# Patient Record
Sex: MALE | Race: OTHER | HISPANIC OR LATINO
[De-identification: names, ages, dates, MRNs, and addresses within clinical notes are randomized per-mention and may not be internally consistent; named-entity substitution may affect disease eponyms.]

---

## 2024-10-04 VITALS — BODY MASS INDEX: 22.8 KG/M2 | HEIGHT: 67.3 IN | WEIGHT: 147 LBS

## 2024-10-04 DIAGNOSIS — Z86.39 PERSONAL HISTORY OF OTHER ENDOCRINE, NUTRITIONAL AND METABOLIC DISEASE: ICD-10-CM

## 2024-10-04 DIAGNOSIS — M50.90 CERVICAL DISC DISORDER, UNSPECIFIED, UNSPECIFIED CERVICAL REGION: ICD-10-CM

## 2024-10-04 DIAGNOSIS — I24.9 ACUTE ISCHEMIC HEART DISEASE, UNSPECIFIED: ICD-10-CM

## 2024-10-04 DIAGNOSIS — K57.90 DIVERTICULOSIS OF INTESTINE, PART UNSPECIFIED, W/OUT PERFORATION OR ABSCESS W/OUT BLEEDING: ICD-10-CM

## 2024-10-04 DIAGNOSIS — Z87.39 PERSONAL HISTORY OF OTHER DISEASES OF THE MUSCULOSKELETAL SYSTEM AND CONNECTIVE TISSUE: ICD-10-CM

## 2024-10-04 DIAGNOSIS — Z95.5 PRESENCE OF CORONARY ANGIOPLASTY IMPLANT AND GRAFT: ICD-10-CM

## 2024-10-04 DIAGNOSIS — I25.10 ATHEROSCLEROTIC HEART DISEASE OF NATIVE CORONARY ARTERY W/OUT ANGINA PECTORIS: ICD-10-CM

## 2024-10-04 DIAGNOSIS — Z86.79 PERSONAL HISTORY OF OTHER DISEASES OF THE CIRCULATORY SYSTEM: ICD-10-CM

## 2024-10-04 DIAGNOSIS — I25.2 OLD MYOCARDIAL INFARCTION: ICD-10-CM

## 2024-10-04 DIAGNOSIS — Z87.438 PERSONAL HISTORY OF OTHER DISEASES OF MALE GENITAL ORGANS: ICD-10-CM

## 2024-10-04 DIAGNOSIS — I21.4 NON-ST ELEVATION (NSTEMI) MYOCARDIAL INFARCTION: ICD-10-CM

## 2024-10-04 DIAGNOSIS — E53.8 DEFICIENCY OF OTHER SPECIFIED B GROUP VITAMINS: ICD-10-CM

## 2024-10-04 DIAGNOSIS — N18.2 CHRONIC KIDNEY DISEASE, STAGE 2 (MILD): ICD-10-CM

## 2024-10-04 DIAGNOSIS — M15.9 POLYOSTEOARTHRITIS, UNSPECIFIED: ICD-10-CM

## 2024-10-04 DIAGNOSIS — Z87.891 PERSONAL HISTORY OF NICOTINE DEPENDENCE: ICD-10-CM

## 2024-10-04 DIAGNOSIS — I48.0 PAROXYSMAL ATRIAL FIBRILLATION: ICD-10-CM

## 2024-10-04 PROBLEM — Z00.00 ENCOUNTER FOR PREVENTIVE HEALTH EXAMINATION: Status: ACTIVE | Noted: 2024-10-04

## 2024-10-04 RX ORDER — ATORVASTATIN CALCIUM 80 MG/1
80 TABLET, FILM COATED ORAL
Qty: 1 | Refills: 1 | Status: ACTIVE | COMMUNITY

## 2024-10-04 RX ORDER — INSULIN GLARGINE-YFGN 100 [IU]/ML
100 INJECTION, SOLUTION SUBCUTANEOUS AT BEDTIME
Refills: 0 | Status: ACTIVE | COMMUNITY

## 2024-10-04 RX ORDER — CANAGLIFLOZIN 300 MG/1
300 TABLET, FILM COATED ORAL DAILY
Refills: 0 | Status: ACTIVE | COMMUNITY

## 2024-10-04 RX ORDER — DULOXETINE HYDROCHLORIDE 60 MG/1
60 CAPSULE, DELAYED RELEASE PELLETS ORAL AT BEDTIME
Refills: 0 | Status: ACTIVE | COMMUNITY

## 2024-10-04 RX ORDER — APIXABAN 5 MG/1
5 TABLET, FILM COATED ORAL
Qty: 60 | Refills: 3 | Status: ACTIVE | COMMUNITY

## 2024-10-04 RX ORDER — ISOSORBIDE MONONITRATE 30 MG/1
30 TABLET, EXTENDED RELEASE ORAL DAILY
Refills: 0 | Status: ACTIVE | COMMUNITY

## 2024-10-04 RX ORDER — FUROSEMIDE 20 MG/1
20 TABLET ORAL DAILY
Qty: 30 | Refills: 0 | Status: ACTIVE | COMMUNITY

## 2024-10-04 RX ORDER — CARVEDILOL 25 MG/1
25 TABLET, FILM COATED ORAL TWICE DAILY
Refills: 0 | Status: ACTIVE | COMMUNITY

## 2024-10-04 RX ORDER — GLIPIZIDE 10 MG/1
10 TABLET, FILM COATED, EXTENDED RELEASE ORAL DAILY
Refills: 0 | Status: ACTIVE | COMMUNITY

## 2024-10-04 RX ORDER — LOSARTAN POTASSIUM 25 MG/1
25 TABLET, FILM COATED ORAL DAILY
Qty: 30 | Refills: 0 | Status: ACTIVE | COMMUNITY

## 2024-10-04 RX ORDER — AMLODIPINE BESYLATE 5 MG/1
5 TABLET ORAL DAILY
Qty: 1 | Refills: 1 | Status: ACTIVE | COMMUNITY

## 2024-10-04 RX ORDER — DEXTRAN 70/HYPROMELLOSE 0.1%-0.3%
0.1-0.3 DROPS OPHTHALMIC (EYE) 4 TIMES DAILY
Refills: 0 | Status: ACTIVE | COMMUNITY

## 2024-10-04 RX ORDER — EZETIMIBE 10 MG/1
10 TABLET ORAL DAILY
Qty: 30 | Refills: 3 | Status: ACTIVE | COMMUNITY

## 2024-10-04 RX ORDER — TAMSULOSIN HYDROCHLORIDE 0.4 MG/1
0.4 CAPSULE ORAL
Refills: 0 | Status: ACTIVE | COMMUNITY

## 2024-10-15 ENCOUNTER — NON-APPOINTMENT (OUTPATIENT)
Age: 79
End: 2024-10-15

## 2024-10-15 ENCOUNTER — APPOINTMENT (OUTPATIENT)
Dept: CARDIOTHORACIC SURGERY | Facility: CLINIC | Age: 79
End: 2024-10-15
Payer: MEDICARE

## 2024-10-15 VITALS
HEIGHT: 69 IN | BODY MASS INDEX: 21.77 KG/M2 | HEART RATE: 80 BPM | SYSTOLIC BLOOD PRESSURE: 126 MMHG | DIASTOLIC BLOOD PRESSURE: 60 MMHG | WEIGHT: 147 LBS | OXYGEN SATURATION: 97 % | TEMPERATURE: 97 F

## 2024-10-15 DIAGNOSIS — I21.4 NON-ST ELEVATION (NSTEMI) MYOCARDIAL INFARCTION: ICD-10-CM

## 2024-10-15 PROCEDURE — 99205 OFFICE O/P NEW HI 60 MIN: CPT

## 2024-10-17 ENCOUNTER — INPATIENT (INPATIENT)
Facility: HOSPITAL | Age: 79
LOS: 5 days | Discharge: HOME HEALTH | End: 2024-10-23
Attending: THORACIC SURGERY (CARDIOTHORACIC VASCULAR SURGERY) | Admitting: THORACIC SURGERY (CARDIOTHORACIC VASCULAR SURGERY)
Payer: MEDICARE

## 2024-10-17 VITALS — HEIGHT: 66 IN | TEMPERATURE: 98 F | WEIGHT: 143.96 LBS

## 2024-10-17 LAB
A1C WITH ESTIMATED AVERAGE GLUCOSE RESULT: 9.1 % — HIGH (ref 4–5.6)
ADD ON TEST-SPECIMEN IN LAB: SIGNIFICANT CHANGE UP
ALBUMIN SERPL ELPH-MCNC: 4 G/DL — SIGNIFICANT CHANGE UP (ref 3.3–5)
ALP SERPL-CCNC: 123 U/L — HIGH (ref 40–120)
ALT FLD-CCNC: 25 U/L — SIGNIFICANT CHANGE UP (ref 10–45)
ANION GAP SERPL CALC-SCNC: 10 MMOL/L — SIGNIFICANT CHANGE UP (ref 5–17)
APTT BLD: 34.5 SEC — SIGNIFICANT CHANGE UP (ref 24.5–35.6)
APTT BLD: 52.4 SEC — HIGH (ref 24.5–35.6)
AST SERPL-CCNC: 20 U/L — SIGNIFICANT CHANGE UP (ref 10–40)
BASOPHILS # BLD AUTO: 0.06 K/UL — SIGNIFICANT CHANGE UP (ref 0–0.2)
BASOPHILS NFR BLD AUTO: 0.7 % — SIGNIFICANT CHANGE UP (ref 0–2)
BILIRUB SERPL-MCNC: 0.6 MG/DL — SIGNIFICANT CHANGE UP (ref 0.2–1.2)
BLD GP AB SCN SERPL QL: NEGATIVE — SIGNIFICANT CHANGE UP
BUN SERPL-MCNC: 26 MG/DL — HIGH (ref 7–23)
CALCIUM SERPL-MCNC: 9.2 MG/DL — SIGNIFICANT CHANGE UP (ref 8.4–10.5)
CHLORIDE SERPL-SCNC: 100 MMOL/L — SIGNIFICANT CHANGE UP (ref 96–108)
CO2 SERPL-SCNC: 27 MMOL/L — SIGNIFICANT CHANGE UP (ref 22–31)
CREAT SERPL-MCNC: 1.06 MG/DL — SIGNIFICANT CHANGE UP (ref 0.5–1.3)
EGFR: 72 ML/MIN/1.73M2 — SIGNIFICANT CHANGE UP
EOSINOPHIL # BLD AUTO: 0.17 K/UL — SIGNIFICANT CHANGE UP (ref 0–0.5)
EOSINOPHIL NFR BLD AUTO: 2.1 % — SIGNIFICANT CHANGE UP (ref 0–6)
ESTIMATED AVERAGE GLUCOSE: 214 MG/DL — HIGH (ref 68–114)
GLUCOSE BLDC GLUCOMTR-MCNC: 136 MG/DL — HIGH (ref 70–99)
GLUCOSE BLDC GLUCOMTR-MCNC: 142 MG/DL — HIGH (ref 70–99)
GLUCOSE SERPL-MCNC: 184 MG/DL — HIGH (ref 70–99)
HCT VFR BLD CALC: 42.7 % — SIGNIFICANT CHANGE UP (ref 39–50)
HGB BLD-MCNC: 14.1 G/DL — SIGNIFICANT CHANGE UP (ref 13–17)
IMM GRANULOCYTES NFR BLD AUTO: 0.2 % — SIGNIFICANT CHANGE UP (ref 0–0.9)
INR BLD: 1.09 — SIGNIFICANT CHANGE UP (ref 0.85–1.16)
LYMPHOCYTES # BLD AUTO: 1.92 K/UL — SIGNIFICANT CHANGE UP (ref 1–3.3)
LYMPHOCYTES # BLD AUTO: 23.9 % — SIGNIFICANT CHANGE UP (ref 13–44)
MAGNESIUM SERPL-MCNC: 2.5 MG/DL — SIGNIFICANT CHANGE UP (ref 1.6–2.6)
MCHC RBC-ENTMCNC: 28.4 PG — SIGNIFICANT CHANGE UP (ref 27–34)
MCHC RBC-ENTMCNC: 33 GM/DL — SIGNIFICANT CHANGE UP (ref 32–36)
MCV RBC AUTO: 86.1 FL — SIGNIFICANT CHANGE UP (ref 80–100)
MONOCYTES # BLD AUTO: 0.79 K/UL — SIGNIFICANT CHANGE UP (ref 0–0.9)
MONOCYTES NFR BLD AUTO: 9.8 % — SIGNIFICANT CHANGE UP (ref 2–14)
NEUTROPHILS # BLD AUTO: 5.08 K/UL — SIGNIFICANT CHANGE UP (ref 1.8–7.4)
NEUTROPHILS NFR BLD AUTO: 63.3 % — SIGNIFICANT CHANGE UP (ref 43–77)
NRBC # BLD: 0 /100 WBCS — SIGNIFICANT CHANGE UP (ref 0–0)
NT-PROBNP SERPL-SCNC: 97 PG/ML — SIGNIFICANT CHANGE UP (ref 0–300)
PLATELET # BLD AUTO: 277 K/UL — SIGNIFICANT CHANGE UP (ref 150–400)
POTASSIUM SERPL-MCNC: 4.4 MMOL/L — SIGNIFICANT CHANGE UP (ref 3.5–5.3)
POTASSIUM SERPL-SCNC: 4.4 MMOL/L — SIGNIFICANT CHANGE UP (ref 3.5–5.3)
PROT SERPL-MCNC: 7.6 G/DL — SIGNIFICANT CHANGE UP (ref 6–8.3)
PROTHROM AB SERPL-ACNC: 12.5 SEC — SIGNIFICANT CHANGE UP (ref 9.9–13.4)
RBC # BLD: 4.96 M/UL — SIGNIFICANT CHANGE UP (ref 4.2–5.8)
RBC # FLD: 13 % — SIGNIFICANT CHANGE UP (ref 10.3–14.5)
RH IG SCN BLD-IMP: POSITIVE — SIGNIFICANT CHANGE UP
SODIUM SERPL-SCNC: 137 MMOL/L — SIGNIFICANT CHANGE UP (ref 135–145)
TSH SERPL-MCNC: 4.25 UIU/ML — HIGH (ref 0.27–4.2)
WBC # BLD: 8.04 K/UL — SIGNIFICANT CHANGE UP (ref 3.8–10.5)
WBC # FLD AUTO: 8.04 K/UL — SIGNIFICANT CHANGE UP (ref 3.8–10.5)

## 2024-10-17 PROCEDURE — 93880 EXTRACRANIAL BILAT STUDY: CPT | Mod: 26

## 2024-10-17 PROCEDURE — 71046 X-RAY EXAM CHEST 2 VIEWS: CPT | Mod: 26

## 2024-10-17 PROCEDURE — 93010 ELECTROCARDIOGRAM REPORT: CPT

## 2024-10-17 RX ORDER — INFLUENZ VIR VAC TV P-SURF2003 15MCG/.5ML
0.5 SYRINGE (ML) INTRAMUSCULAR ONCE
Refills: 0 | Status: DISCONTINUED | OUTPATIENT
Start: 2024-10-17 | End: 2024-10-18

## 2024-10-17 RX ORDER — INSULIN LISPRO 100/ML
5 VIAL (ML) SUBCUTANEOUS
Refills: 0 | Status: DISCONTINUED | OUTPATIENT
Start: 2024-10-17 | End: 2024-10-18

## 2024-10-17 RX ORDER — INSULIN GLARGINE,HUM.REC.ANLOG 100/ML
35 VIAL (ML) SUBCUTANEOUS AT BEDTIME
Refills: 0 | Status: DISCONTINUED | OUTPATIENT
Start: 2024-10-17 | End: 2024-10-17

## 2024-10-17 RX ORDER — CHLORHEXIDINE GLUCONATE 40 MG/ML
1 SOLUTION TOPICAL ONCE
Refills: 0 | Status: COMPLETED | OUTPATIENT
Start: 2024-10-18 | End: 2024-10-18

## 2024-10-17 RX ORDER — INSULIN GLARGINE,HUM.REC.ANLOG 100/ML
40 VIAL (ML) SUBCUTANEOUS AT BEDTIME
Refills: 0 | Status: DISCONTINUED | OUTPATIENT
Start: 2024-10-17 | End: 2024-10-17

## 2024-10-17 RX ORDER — ASPIRIN/MAG CARB/ALUMINUM AMIN 325 MG
81 TABLET ORAL DAILY
Refills: 0 | Status: DISCONTINUED | OUTPATIENT
Start: 2024-10-18 | End: 2024-10-18

## 2024-10-17 RX ORDER — HEPARIN SODIUM 10000 [USP'U]/ML
750 INJECTION INTRAVENOUS; SUBCUTANEOUS
Qty: 25000 | Refills: 0 | Status: DISCONTINUED | OUTPATIENT
Start: 2024-10-17 | End: 2024-10-18

## 2024-10-17 RX ORDER — CHLORHEXIDINE GLUCONATE 40 MG/ML
1 SOLUTION TOPICAL ONCE
Refills: 0 | Status: COMPLETED | OUTPATIENT
Start: 2024-10-17 | End: 2024-10-18

## 2024-10-17 RX ORDER — INSULIN LISPRO 100/ML
VIAL (ML) SUBCUTANEOUS
Refills: 0 | Status: DISCONTINUED | OUTPATIENT
Start: 2024-10-17 | End: 2024-10-18

## 2024-10-17 RX ORDER — TAMSULOSIN HCL 0.4 MG
0.4 CAPSULE ORAL AT BEDTIME
Refills: 0 | Status: DISCONTINUED | OUTPATIENT
Start: 2024-10-17 | End: 2024-10-18

## 2024-10-17 RX ORDER — DULOXETINE HYDROCHLORIDE 30 MG/1
1 CAPSULE, DELAYED RELEASE ORAL
Refills: 0 | DISCHARGE

## 2024-10-17 RX ORDER — CARVEDILOL 25 MG/1
25 TABLET, FILM COATED ORAL EVERY 12 HOURS
Refills: 0 | Status: DISCONTINUED | OUTPATIENT
Start: 2024-10-17 | End: 2024-10-18

## 2024-10-17 RX ORDER — PANTOPRAZOLE SODIUM 40 MG/1
40 TABLET, DELAYED RELEASE ORAL
Refills: 0 | Status: DISCONTINUED | OUTPATIENT
Start: 2024-10-17 | End: 2024-10-18

## 2024-10-17 RX ORDER — EZETIMIBE 10 MG/1
1 TABLET ORAL
Refills: 0 | DISCHARGE

## 2024-10-17 RX ORDER — CHLORHEXIDINE GLUCONATE 40 MG/ML
10 SOLUTION TOPICAL ONCE
Refills: 0 | Status: COMPLETED | OUTPATIENT
Start: 2024-10-17 | End: 2024-10-18

## 2024-10-17 RX ORDER — INSULIN GLARGINE,HUM.REC.ANLOG 100/ML
15 VIAL (ML) SUBCUTANEOUS AT BEDTIME
Refills: 0 | Status: DISCONTINUED | OUTPATIENT
Start: 2024-10-17 | End: 2024-10-18

## 2024-10-17 RX ORDER — GLUCAGON INJECTION, SOLUTION 1 MG/.2ML
1 INJECTION, SOLUTION SUBCUTANEOUS ONCE
Refills: 0 | Status: DISCONTINUED | OUTPATIENT
Start: 2024-10-17 | End: 2024-10-18

## 2024-10-17 RX ORDER — CHLORHEXIDINE GLUCONATE 40 MG/ML
1 SOLUTION TOPICAL ONCE
Refills: 0 | Status: COMPLETED | OUTPATIENT
Start: 2024-10-17 | End: 2024-10-17

## 2024-10-17 RX ORDER — DULOXETINE HYDROCHLORIDE 30 MG/1
60 CAPSULE, DELAYED RELEASE ORAL DAILY
Refills: 0 | Status: DISCONTINUED | OUTPATIENT
Start: 2024-10-17 | End: 2024-10-18

## 2024-10-17 RX ORDER — ASCORBIC ACID 500 MG
2000 TABLET ORAL ONCE
Refills: 0 | Status: COMPLETED | OUTPATIENT
Start: 2024-10-17 | End: 2024-10-18

## 2024-10-17 RX ORDER — MUPIROCIN 20 MG/G
1 OINTMENT TOPICAL ONCE
Refills: 0 | Status: DISCONTINUED | OUTPATIENT
Start: 2024-10-18 | End: 2024-10-18

## 2024-10-17 RX ORDER — TAMSULOSIN HCL 0.4 MG
1 CAPSULE ORAL
Refills: 0 | DISCHARGE

## 2024-10-17 RX ORDER — ACETAMINOPHEN 500 MG
650 TABLET ORAL EVERY 6 HOURS
Refills: 0 | Status: DISCONTINUED | OUTPATIENT
Start: 2024-10-17 | End: 2024-10-18

## 2024-10-17 RX ORDER — ACETAMINOPHEN 500 MG
1000 TABLET ORAL ONCE
Refills: 0 | Status: COMPLETED | OUTPATIENT
Start: 2024-10-18 | End: 2024-10-18

## 2024-10-17 RX ORDER — SODIUM CHLORIDE 9 MG/ML
3 INJECTION, SOLUTION INTRAMUSCULAR; INTRAVENOUS; SUBCUTANEOUS EVERY 8 HOURS
Refills: 0 | Status: DISCONTINUED | OUTPATIENT
Start: 2024-10-17 | End: 2024-10-18

## 2024-10-17 RX ADMIN — Medication 80 MILLIGRAM(S): at 22:29

## 2024-10-17 RX ADMIN — CHLORHEXIDINE GLUCONATE 1 APPLICATION(S): 40 SOLUTION TOPICAL at 23:58

## 2024-10-17 RX ADMIN — HEPARIN SODIUM 7.5 UNIT(S)/HR: 10000 INJECTION INTRAVENOUS; SUBCUTANEOUS at 18:56

## 2024-10-17 RX ADMIN — Medication 0.4 MILLIGRAM(S): at 22:30

## 2024-10-17 RX ADMIN — SODIUM CHLORIDE 3 MILLILITER(S): 9 INJECTION, SOLUTION INTRAMUSCULAR; INTRAVENOUS; SUBCUTANEOUS at 22:35

## 2024-10-17 NOTE — H&P ADULT - ASSESSMENT
80 YO Cuban-speaking Male with PMHx of HTN, HLD, DMII (A1C 11.7%), pAF (on Eliquis), HFpEF, CKD-stage II, MI s/p PCI->OM1 in 2019 and NSTEMI 1/17/24 s/p Cardiac cath w/mid LAD stenosis s/p PTCA c/b dissection, now s/p recent NSTEMI w/ severe LAD stenosis. Patient presented to [facility] on 9/30/24 with new onset chest pain at rest. In the ED, he was ruled in for NSTEMI. 10/1/24 TTE revealed LVEF 60%, dilated ascending aorta, no significant valvular disease. 10/1/24 s/p Cardiac cath that revealed patent stent in OM1, 80-90% mid-distal LAD stenosis. Patient medically managed and discharged home on 10/2. Patient was referred to Dr. Patrick and presents to [facility] today for pre-op optimization and heparin gtt prior to planned MIDCAB on 10/18/24.     Plan:    Neurovascular:   -Pain well controlled with current regimen. PRN's: Tylenol    Cardiovascular:   -CAD  -PST pending  -MIDCAB tomorrow   -Hx of HTN  -Hx of HLD  -Hx of pAFib  -Cont heparin gtt  -Trend PTT  -Hemodynamically stable.   -Monitor: BP, HR, tele    Respiratory:   -Oxygenating well on room air  -Encourage continued use of IS 10x/hr and frequent ambulation  -CXR: pending    GI:  -GI PPX: Protonix  -PO Diet  -Bowel Regimen: none     Renal / :  -Continue to monitor renal function: BUN/Cr  -Monitor I/O's daily     Endocrine:    -Hx of DMII  -A1c:  -Cont ISS  -No hx of hypothyroidism   -TSH:    Hematologic:  -CBC: H/H-  -Coagulation Panel.    ID:  -Temperature: Afebrile  -CBC: WBC-    Prophylaxis:  -Cont heparin gtt  -Continue with SCD's b/l while patient is at rest     Disposition:  -OR tomorrow   78 YO Solomon Islander-speaking Male with PMHx of HTN, HLD, DMII (A1C 11.7%), pAF (on Eliquis), HFpEF, CKD-stage II, MI s/p PCI->OM1 in 2019 and NSTEMI 1/17/24 s/p Cardiac cath w/mid LAD stenosis s/p PTCA c/b dissection, now s/p recent NSTEMI w/ severe LAD stenosis. Patient presented to [facility] on 9/30/24 with new onset chest pain at rest. In the ED, he was ruled in for NSTEMI. 10/1/24 TTE revealed LVEF 60%, dilated ascending aorta, no significant valvular disease. 10/1/24 s/p Cardiac cath that revealed patent stent in OM1, 80-90% mid-distal LAD stenosis. Patient medically managed and discharged home on 10/2. Patient was referred to Dr. Patrick and presents to [facility] today for pre-op optimization and heparin gtt prior to planned MIDCAB on 10/18/24.     Plan:    Neurovascular:   -Cont home Duloxetine   -Pain well controlled with current regimen. PRN's: Tylenol    Cardiovascular:   -CAD  -PST pending  -MIDCAB tomorrow   -Cont ASA, BB and statin  -Hx of HTN  -Cont BB  -Hold home Losartan and Norvasc  -Hx of HLD  -Cont statin  -Hold home Zetia  -Hx of pAFib  -Cont heparin gtt  -Trend PTT  -Hold home Eliquis  -Hemodynamically stable.   -Monitor: BP, HR, tele    Respiratory:   -Oxygenating well on room air  -Encourage continued use of IS 10x/hr and frequent ambulation  -CXR: pending    GI:  -GI PPX: Protonix  -PO Diet  -Bowel Regimen: none     Renal / :  -Hx of BPH  -Cont Flomax  -Continue to monitor renal function: BUN/Cr: pending  -Monitor I/O's daily     Endocrine:    -Hx of DMII  -A1c: 11.7 (pending for today)  -Cont ISS, lantus  -Home meds: Repaglinide, Invokana and Glipizide  -No hx of hypothyroidism   -TSH: pending    Hematologic:  -CBC: H/H- 14/42  -Coagulation Panel.    ID:  -Temperature: Afebrile  -CBC: WBC- 8    Prophylaxis:  -Cont heparin gtt  -Continue with SCD's b/l while patient is at rest     Disposition:  -OR tomorrow

## 2024-10-17 NOTE — H&P ADULT - NSHPLABSRESULTS_GEN_ALL_CORE
Vital Signs Last 24 Hrs  T(C): 36.8 (17 Oct 2024 17:46), Max: 36.8 (17 Oct 2024 17:46)  T(F): 98.3 (17 Oct 2024 17:46), Max: 98.3 (17 Oct 2024 17:46)  HR: 82 (17 Oct 2024 17:48) (82 - 82)  BP: 154/76 (17 Oct 2024 17:48) (154/76 - 154/76)  BP(mean): 108 (17 Oct 2024 17:48) (108 - 108)  RR: 16 (17 Oct 2024 17:48) (16 - 16)  SpO2: 99% (17 Oct 2024 17:48) (99% - 99%)    Parameters below as of 17 Oct 2024 17:48  Patient On (Oxygen Delivery Method): room air                          14.1   8.04  )-----------( 277      ( 17 Oct 2024 18:08 )             42.7

## 2024-10-17 NOTE — H&P ADULT - HISTORY OF PRESENT ILLNESS
80 YO Chadian-speaking Male with PMHx of HTN, HLD, DMII (A1C 11.7%), pAF (on Eliquis), HFpEF, CKD-stage II, MI s/p PCI->OM1 in 2019 and NSTEMI 1/17/24 s/p Cardiac cath w/mid LAD stenosis s/p PTCA c/b dissection, now s/p recent NSTEMI w/ severe LAD stenosis. Patient presented to [facility] on 9/30/24 with new onset chest pain at rest. In the ED, he was ruled in for NSTEMI. 10/1/24 TTE revealed LVEF 60%, dilated ascending aorta, no significant valvular disease. 10/1/24 s/p Cardiac cath that revealed patent stent in OM1, 80-90% mid-distal LAD stenosis. Patient medically managed and discharged home on 10/2. Patient was referred to Dr. Patrick and presents to [facility] today for pre-op optimization and heparin gtt prior to planned MIDCAB on 10/18/24.    78 YO Vincentian-speaking Male with PMHx of HTN, HLD, DMII (A1C 11.7%), pAF (on Eliquis), HFpEF, CKD-stage II, MI s/p PCI->OM1 in 2019 and NSTEMI 1/17/24 s/p Cardiac cath w/mid LAD stenosis s/p PTCA c/b dissection, now s/p recent NSTEMI w/ severe LAD stenosis. Patient presented to [facility] on 9/30/24 with new onset chest pain at rest. In the ED, he was ruled in for NSTEMI. 10/1/24 TTE revealed LVEF 60%, dilated ascending aorta, no significant valvular disease. 10/1/24 s/p Cardiac cath that revealed patent stent in OM1, 80-90% mid-distal LAD stenosis. Patient medically managed and discharged home on 10/2. Patient was referred to Dr. Patrick and presents to [facility] today for pre-op optimization and heparin gtt prior to planned MIDCAB on 10/18/24. At present, patient states that he feels well, denies fever, chills, chest pain, SOB, palpitations, N/V/D.

## 2024-10-17 NOTE — H&P ADULT - NSHPPHYSICALEXAM_GEN_ALL_CORE
PHYSICAL EXAM:  GENERAL: NAD, lying in bed comfortably  HEAD:  Atraumatic, Normocephalic  EYES: EOMI, PERRLA, conjunctiva and sclera clear  ENT: Moist mucous membranes  NECK: Supple, No JVD  CHEST/LUNG: Clear to auscultation bilaterally; No rales, rhonchi, wheezing, or rubs. Unlabored respirations  HEART: Regular rate and rhythm; No murmurs, rubs, or gallops  ABDOMEN: Bowel sounds present; Soft, Nontender, Nondistended. No hepatomegally  EXTREMITIES:  2+ Peripheral Pulses, brisk capillary refill. No clubbing, cyanosis, or edema  NERVOUS SYSTEM:  Alert & Oriented X3, speech clear. No deficits

## 2024-10-17 NOTE — H&P ADULT - NSICDXPASTMEDICALHX_GEN_ALL_CORE_FT
PAST MEDICAL HISTORY:  Atrial fibrillation     CAD (coronary artery disease)     DM (diabetes mellitus)     HLD (hyperlipidemia)     HTN (hypertension)     NSTEMI (non-ST elevation myocardial infarction)     Stage 2 chronic kidney disease

## 2024-10-17 NOTE — PATIENT PROFILE ADULT - FALL HARM RISK - UNIVERSAL INTERVENTIONS
Bed in lowest position, wheels locked, appropriate side rails in place/Call bell, personal items and telephone in reach/Instruct patient to call for assistance before getting out of bed or chair/Non-slip footwear when patient is out of bed/[place] to call system/Physically safe environment - no spills, clutter or unnecessary equipment/Purposeful Proactive Rounding/Room/bathroom lighting operational, light cord in reach

## 2024-10-18 ENCOUNTER — TRANSCRIPTION ENCOUNTER (OUTPATIENT)
Age: 79
End: 2024-10-18

## 2024-10-18 ENCOUNTER — APPOINTMENT (OUTPATIENT)
Age: 79
End: 2024-10-18

## 2024-10-18 ENCOUNTER — RESULT REVIEW (OUTPATIENT)
Age: 79
End: 2024-10-18

## 2024-10-18 DIAGNOSIS — E11.9 TYPE 2 DIABETES MELLITUS WITHOUT COMPLICATIONS: ICD-10-CM

## 2024-10-18 LAB
ALBUMIN SERPL ELPH-MCNC: 3.9 G/DL — SIGNIFICANT CHANGE UP (ref 3.3–5)
ALP SERPL-CCNC: 114 U/L — SIGNIFICANT CHANGE UP (ref 40–120)
ALT FLD-CCNC: 29 U/L — SIGNIFICANT CHANGE UP (ref 10–45)
ANION GAP SERPL CALC-SCNC: 10 MMOL/L — SIGNIFICANT CHANGE UP (ref 5–17)
ANION GAP SERPL CALC-SCNC: 10 MMOL/L — SIGNIFICANT CHANGE UP (ref 5–17)
APPEARANCE UR: CLEAR — SIGNIFICANT CHANGE UP
APTT BLD: 31 SEC — SIGNIFICANT CHANGE UP (ref 24.5–35.6)
APTT BLD: 76.7 SEC — HIGH (ref 24.5–35.6)
AST SERPL-CCNC: 36 U/L — SIGNIFICANT CHANGE UP (ref 10–40)
BACTERIA # UR AUTO: ABNORMAL /HPF
BASE EXCESS BLDA CALC-SCNC: -2.4 MMOL/L — LOW (ref -2–3)
BASE EXCESS BLDA CALC-SCNC: -2.6 MMOL/L — LOW (ref -2–3)
BASE EXCESS BLDA CALC-SCNC: -4.2 MMOL/L — LOW (ref -2–3)
BASE EXCESS BLDA CALC-SCNC: -4.9 MMOL/L — LOW (ref -2–3)
BASE EXCESS BLDA CALC-SCNC: -6.1 MMOL/L — LOW (ref -2–3)
BASE EXCESS BLDA CALC-SCNC: 0.5 MMOL/L — SIGNIFICANT CHANGE UP (ref -2–3)
BASOPHILS # BLD AUTO: 0.04 K/UL — SIGNIFICANT CHANGE UP (ref 0–0.2)
BASOPHILS NFR BLD AUTO: 0.2 % — SIGNIFICANT CHANGE UP (ref 0–2)
BILIRUB SERPL-MCNC: 0.8 MG/DL — SIGNIFICANT CHANGE UP (ref 0.2–1.2)
BILIRUB UR-MCNC: NEGATIVE — SIGNIFICANT CHANGE UP
BUN SERPL-MCNC: 21 MG/DL — SIGNIFICANT CHANGE UP (ref 7–23)
BUN SERPL-MCNC: 28 MG/DL — HIGH (ref 7–23)
CA-I BLDA-SCNC: 1.01 MMOL/L — LOW (ref 1.15–1.33)
CA-I BLDA-SCNC: 1.07 MMOL/L — LOW (ref 1.15–1.33)
CA-I BLDA-SCNC: 1.08 MMOL/L — LOW (ref 1.15–1.33)
CA-I BLDA-SCNC: 1.16 MMOL/L — SIGNIFICANT CHANGE UP (ref 1.15–1.33)
CA-I BLDA-SCNC: 1.17 MMOL/L — SIGNIFICANT CHANGE UP (ref 1.15–1.33)
CA-I BLDA-SCNC: 1.2 MMOL/L — SIGNIFICANT CHANGE UP (ref 1.15–1.33)
CALCIUM SERPL-MCNC: 8.2 MG/DL — LOW (ref 8.4–10.5)
CALCIUM SERPL-MCNC: 9.1 MG/DL — SIGNIFICANT CHANGE UP (ref 8.4–10.5)
CAST: 1 /LPF — SIGNIFICANT CHANGE UP (ref 0–4)
CHLORIDE SERPL-SCNC: 103 MMOL/L — SIGNIFICANT CHANGE UP (ref 96–108)
CHLORIDE SERPL-SCNC: 103 MMOL/L — SIGNIFICANT CHANGE UP (ref 96–108)
CO2 BLDA-SCNC: 20 MMOL/L — SIGNIFICANT CHANGE UP (ref 19–24)
CO2 BLDA-SCNC: 22 MMOL/L — SIGNIFICANT CHANGE UP (ref 19–24)
CO2 BLDA-SCNC: 22 MMOL/L — SIGNIFICANT CHANGE UP (ref 19–24)
CO2 BLDA-SCNC: 24 MMOL/L — SIGNIFICANT CHANGE UP (ref 19–24)
CO2 BLDA-SCNC: 25 MMOL/L — HIGH (ref 19–24)
CO2 BLDA-SCNC: 26 MMOL/L — HIGH (ref 19–24)
CO2 SERPL-SCNC: 22 MMOL/L — SIGNIFICANT CHANGE UP (ref 22–31)
CO2 SERPL-SCNC: 23 MMOL/L — SIGNIFICANT CHANGE UP (ref 22–31)
COHGB MFR BLDA: 0 % — SIGNIFICANT CHANGE UP
COHGB MFR BLDA: 0.1 % — SIGNIFICANT CHANGE UP
COHGB MFR BLDA: 0.2 % — SIGNIFICANT CHANGE UP
COHGB MFR BLDA: 0.3 % — SIGNIFICANT CHANGE UP
COHGB MFR BLDA: 0.4 % — SIGNIFICANT CHANGE UP
COHGB MFR BLDA: 0.4 % — SIGNIFICANT CHANGE UP
COLOR SPEC: YELLOW — SIGNIFICANT CHANGE UP
CREAT SERPL-MCNC: 0.71 MG/DL — SIGNIFICANT CHANGE UP (ref 0.5–1.3)
CREAT SERPL-MCNC: 0.88 MG/DL — SIGNIFICANT CHANGE UP (ref 0.5–1.3)
DIFF PNL FLD: NEGATIVE — SIGNIFICANT CHANGE UP
EGFR: 89 ML/MIN/1.73M2 — SIGNIFICANT CHANGE UP
EGFR: 94 ML/MIN/1.73M2 — SIGNIFICANT CHANGE UP
EOSINOPHIL # BLD AUTO: 0.01 K/UL — SIGNIFICANT CHANGE UP (ref 0–0.5)
EOSINOPHIL NFR BLD AUTO: 0.1 % — SIGNIFICANT CHANGE UP (ref 0–6)
GAS PNL BLDA: SIGNIFICANT CHANGE UP
GAS PNL BLDA: SIGNIFICANT CHANGE UP
GLUCOSE BLDA-MCNC: 101 MG/DL — HIGH (ref 70–99)
GLUCOSE BLDA-MCNC: 110 MG/DL — HIGH (ref 70–99)
GLUCOSE BLDA-MCNC: 113 MG/DL — HIGH (ref 70–99)
GLUCOSE BLDA-MCNC: 134 MG/DL — HIGH (ref 70–99)
GLUCOSE BLDA-MCNC: 138 MG/DL — HIGH (ref 70–99)
GLUCOSE BLDA-MCNC: 93 MG/DL — SIGNIFICANT CHANGE UP (ref 70–99)
GLUCOSE BLDC GLUCOMTR-MCNC: 153 MG/DL — HIGH (ref 70–99)
GLUCOSE BLDC GLUCOMTR-MCNC: 197 MG/DL — HIGH (ref 70–99)
GLUCOSE BLDC GLUCOMTR-MCNC: 73 MG/DL — SIGNIFICANT CHANGE UP (ref 70–99)
GLUCOSE BLDC GLUCOMTR-MCNC: 82 MG/DL — SIGNIFICANT CHANGE UP (ref 70–99)
GLUCOSE BLDC GLUCOMTR-MCNC: 95 MG/DL — SIGNIFICANT CHANGE UP (ref 70–99)
GLUCOSE SERPL-MCNC: 220 MG/DL — HIGH (ref 70–99)
GLUCOSE SERPL-MCNC: 85 MG/DL — SIGNIFICANT CHANGE UP (ref 70–99)
GLUCOSE UR QL: >=1000 MG/DL
HCO3 BLDA-SCNC: 19 MMOL/L — LOW (ref 21–28)
HCO3 BLDA-SCNC: 21 MMOL/L — SIGNIFICANT CHANGE UP (ref 21–28)
HCO3 BLDA-SCNC: 21 MMOL/L — SIGNIFICANT CHANGE UP (ref 21–28)
HCO3 BLDA-SCNC: 23 MMOL/L — SIGNIFICANT CHANGE UP (ref 21–28)
HCO3 BLDA-SCNC: 24 MMOL/L — SIGNIFICANT CHANGE UP (ref 21–28)
HCO3 BLDA-SCNC: 25 MMOL/L — SIGNIFICANT CHANGE UP (ref 21–28)
HCT VFR BLD CALC: 39.6 % — SIGNIFICANT CHANGE UP (ref 39–50)
HCT VFR BLD CALC: 43.5 % — SIGNIFICANT CHANGE UP (ref 39–50)
HGB BLD-MCNC: 13.2 G/DL — SIGNIFICANT CHANGE UP (ref 13–17)
HGB BLD-MCNC: 14.3 G/DL — SIGNIFICANT CHANGE UP (ref 13–17)
HGB BLDA-MCNC: 11.3 G/DL — LOW (ref 12.6–17.4)
HGB BLDA-MCNC: 11.7 G/DL — LOW (ref 12.6–17.4)
HGB BLDA-MCNC: 12.5 G/DL — LOW (ref 12.6–17.4)
HGB BLDA-MCNC: 12.9 G/DL — SIGNIFICANT CHANGE UP (ref 12.6–17.4)
HGB BLDA-MCNC: 13.3 G/DL — SIGNIFICANT CHANGE UP (ref 12.6–17.4)
HGB BLDA-MCNC: 9.5 G/DL — LOW (ref 12.6–17.4)
IMM GRANULOCYTES NFR BLD AUTO: 0.6 % — SIGNIFICANT CHANGE UP (ref 0–0.9)
INR BLD: 1.01 — SIGNIFICANT CHANGE UP (ref 0.85–1.16)
INR BLD: 1.14 — SIGNIFICANT CHANGE UP (ref 0.85–1.16)
KETONES UR-MCNC: NEGATIVE MG/DL — SIGNIFICANT CHANGE UP
LEUKOCYTE ESTERASE UR-ACNC: ABNORMAL
LYMPHOCYTES # BLD AUTO: 1.08 K/UL — SIGNIFICANT CHANGE UP (ref 1–3.3)
LYMPHOCYTES # BLD AUTO: 6.2 % — LOW (ref 13–44)
MAGNESIUM SERPL-MCNC: 1.8 MG/DL — SIGNIFICANT CHANGE UP (ref 1.6–2.6)
MAGNESIUM SERPL-MCNC: 2.3 MG/DL — SIGNIFICANT CHANGE UP (ref 1.6–2.6)
MCHC RBC-ENTMCNC: 27.6 PG — SIGNIFICANT CHANGE UP (ref 27–34)
MCHC RBC-ENTMCNC: 28.5 PG — SIGNIFICANT CHANGE UP (ref 27–34)
MCHC RBC-ENTMCNC: 32.9 GM/DL — SIGNIFICANT CHANGE UP (ref 32–36)
MCHC RBC-ENTMCNC: 33.3 GM/DL — SIGNIFICANT CHANGE UP (ref 32–36)
MCV RBC AUTO: 82.7 FL — SIGNIFICANT CHANGE UP (ref 80–100)
MCV RBC AUTO: 86.7 FL — SIGNIFICANT CHANGE UP (ref 80–100)
METHGB MFR BLDA: 0.3 % — SIGNIFICANT CHANGE UP
METHGB MFR BLDA: 0.5 % — SIGNIFICANT CHANGE UP
METHGB MFR BLDA: 0.6 % — SIGNIFICANT CHANGE UP
METHGB MFR BLDA: 0.7 % — SIGNIFICANT CHANGE UP
METHGB MFR BLDA: 0.7 % — SIGNIFICANT CHANGE UP
METHGB MFR BLDA: 0.8 % — SIGNIFICANT CHANGE UP
MONOCYTES # BLD AUTO: 0.81 K/UL — SIGNIFICANT CHANGE UP (ref 0–0.9)
MONOCYTES NFR BLD AUTO: 4.6 % — SIGNIFICANT CHANGE UP (ref 2–14)
NEUTROPHILS # BLD AUTO: 15.38 K/UL — HIGH (ref 1.8–7.4)
NEUTROPHILS NFR BLD AUTO: 88.3 % — HIGH (ref 43–77)
NITRITE UR-MCNC: NEGATIVE — SIGNIFICANT CHANGE UP
NRBC # BLD: 0 /100 WBCS — SIGNIFICANT CHANGE UP (ref 0–0)
NRBC # BLD: 0 /100 WBCS — SIGNIFICANT CHANGE UP (ref 0–0)
OXYHGB MFR BLDA: 96.6 % — HIGH (ref 90–95)
OXYHGB MFR BLDA: 97.4 % — HIGH (ref 90–95)
OXYHGB MFR BLDA: 97.4 % — HIGH (ref 90–95)
OXYHGB MFR BLDA: 97.5 % — HIGH (ref 90–95)
OXYHGB MFR BLDA: 97.6 % — HIGH (ref 90–95)
OXYHGB MFR BLDA: 97.7 % — HIGH (ref 90–95)
PCO2 BLDA: 34 MMHG — LOW (ref 35–48)
PCO2 BLDA: 37 MMHG — SIGNIFICANT CHANGE UP (ref 35–48)
PCO2 BLDA: 38 MMHG — SIGNIFICANT CHANGE UP (ref 35–48)
PCO2 BLDA: 39 MMHG — SIGNIFICANT CHANGE UP (ref 35–48)
PCO2 BLDA: 43 MMHG — SIGNIFICANT CHANGE UP (ref 35–48)
PCO2 BLDA: 45 MMHG — SIGNIFICANT CHANGE UP (ref 35–48)
PH BLDA: 7.33 — LOW (ref 7.35–7.45)
PH BLDA: 7.33 — LOW (ref 7.35–7.45)
PH BLDA: 7.34 — LOW (ref 7.35–7.45)
PH BLDA: 7.35 — SIGNIFICANT CHANGE UP (ref 7.35–7.45)
PH BLDA: 7.35 — SIGNIFICANT CHANGE UP (ref 7.35–7.45)
PH BLDA: 7.43 — SIGNIFICANT CHANGE UP (ref 7.35–7.45)
PH UR: 7 — SIGNIFICANT CHANGE UP (ref 5–8)
PHOSPHATE SERPL-MCNC: 3.5 MG/DL — SIGNIFICANT CHANGE UP (ref 2.5–4.5)
PLATELET # BLD AUTO: 244 K/UL — SIGNIFICANT CHANGE UP (ref 150–400)
PLATELET # BLD AUTO: 250 K/UL — SIGNIFICANT CHANGE UP (ref 150–400)
PO2 BLDA: 151 MMHG — HIGH (ref 83–108)
PO2 BLDA: 172 MMHG — HIGH (ref 83–108)
PO2 BLDA: 211 MMHG — HIGH (ref 83–108)
PO2 BLDA: 238 MMHG — HIGH (ref 83–108)
PO2 BLDA: 327 MMHG — HIGH (ref 83–108)
PO2 BLDA: 497 MMHG — HIGH (ref 83–108)
POTASSIUM BLDA-SCNC: 3.1 MMOL/L — LOW (ref 3.5–5.1)
POTASSIUM BLDA-SCNC: 3.6 MMOL/L — SIGNIFICANT CHANGE UP (ref 3.5–5.1)
POTASSIUM BLDA-SCNC: 3.6 MMOL/L — SIGNIFICANT CHANGE UP (ref 3.5–5.1)
POTASSIUM BLDA-SCNC: 3.9 MMOL/L — SIGNIFICANT CHANGE UP (ref 3.5–5.1)
POTASSIUM BLDA-SCNC: 3.9 MMOL/L — SIGNIFICANT CHANGE UP (ref 3.5–5.1)
POTASSIUM BLDA-SCNC: 4 MMOL/L — SIGNIFICANT CHANGE UP (ref 3.5–5.1)
POTASSIUM SERPL-MCNC: 4 MMOL/L — SIGNIFICANT CHANGE UP (ref 3.5–5.3)
POTASSIUM SERPL-MCNC: 4.1 MMOL/L — SIGNIFICANT CHANGE UP (ref 3.5–5.3)
POTASSIUM SERPL-SCNC: 4 MMOL/L — SIGNIFICANT CHANGE UP (ref 3.5–5.3)
POTASSIUM SERPL-SCNC: 4.1 MMOL/L — SIGNIFICANT CHANGE UP (ref 3.5–5.3)
PROT SERPL-MCNC: 7.1 G/DL — SIGNIFICANT CHANGE UP (ref 6–8.3)
PROT UR-MCNC: NEGATIVE MG/DL — SIGNIFICANT CHANGE UP
PROTHROM AB SERPL-ACNC: 11.6 SEC — SIGNIFICANT CHANGE UP (ref 9.9–13.4)
PROTHROM AB SERPL-ACNC: 13.3 SEC — SIGNIFICANT CHANGE UP (ref 9.9–13.4)
RBC # BLD: 4.79 M/UL — SIGNIFICANT CHANGE UP (ref 4.2–5.8)
RBC # BLD: 5.02 M/UL — SIGNIFICANT CHANGE UP (ref 4.2–5.8)
RBC # FLD: 12.5 % — SIGNIFICANT CHANGE UP (ref 10.3–14.5)
RBC # FLD: 13 % — SIGNIFICANT CHANGE UP (ref 10.3–14.5)
RBC CASTS # UR COMP ASSIST: 1 /HPF — SIGNIFICANT CHANGE UP (ref 0–4)
SAO2 % BLDA: 97.3 % — SIGNIFICANT CHANGE UP (ref 94–98)
SAO2 % BLDA: 98.3 % — HIGH (ref 94–98)
SAO2 % BLDA: 98.8 % — HIGH (ref 94–98)
SODIUM BLDA-SCNC: 134 MMOL/L — LOW (ref 136–145)
SODIUM BLDA-SCNC: 135 MMOL/L — LOW (ref 136–145)
SODIUM BLDA-SCNC: 136 MMOL/L — SIGNIFICANT CHANGE UP (ref 136–145)
SODIUM BLDA-SCNC: 138 MMOL/L — SIGNIFICANT CHANGE UP (ref 136–145)
SODIUM SERPL-SCNC: 135 MMOL/L — SIGNIFICANT CHANGE UP (ref 135–145)
SODIUM SERPL-SCNC: 136 MMOL/L — SIGNIFICANT CHANGE UP (ref 135–145)
SP GR SPEC: >1.03 — HIGH (ref 1–1.03)
SQUAMOUS # UR AUTO: 14 /HPF — HIGH (ref 0–5)
UROBILINOGEN FLD QL: 1 MG/DL — SIGNIFICANT CHANGE UP (ref 0.2–1)
WBC # BLD: 11.12 K/UL — HIGH (ref 3.8–10.5)
WBC # BLD: 17.42 K/UL — HIGH (ref 3.8–10.5)
WBC # FLD AUTO: 11.12 K/UL — HIGH (ref 3.8–10.5)
WBC # FLD AUTO: 17.42 K/UL — HIGH (ref 3.8–10.5)
WBC UR QL: 10 /HPF — HIGH (ref 0–5)

## 2024-10-18 PROCEDURE — 33268 EXCL LAA OPN OTH PX ANY METH: CPT

## 2024-10-18 PROCEDURE — 33533 CABG ARTERIAL SINGLE: CPT

## 2024-10-18 PROCEDURE — 93010 ELECTROCARDIOGRAM REPORT: CPT

## 2024-10-18 PROCEDURE — 93306 TTE W/DOPPLER COMPLETE: CPT | Mod: 26

## 2024-10-18 PROCEDURE — 99291 CRITICAL CARE FIRST HOUR: CPT

## 2024-10-18 PROCEDURE — 71045 X-RAY EXAM CHEST 1 VIEW: CPT | Mod: 26

## 2024-10-18 DEVICE — CHEST DRAIN THORACIC PVC 28FR RIGHT ANGLE: Type: IMPLANTABLE DEVICE | Status: FUNCTIONAL

## 2024-10-18 DEVICE — IMPLANTABLE DEVICE: Type: IMPLANTABLE DEVICE | Status: FUNCTIONAL

## 2024-10-18 DEVICE — SHUNT VESSEL TAPR TIP 1.75MM 12MM SHAFT BX/10: Type: IMPLANTABLE DEVICE | Status: FUNCTIONAL

## 2024-10-18 RX ORDER — POLYETHYLENE GLYCOL 3350 17 G/17G
17 POWDER, FOR SOLUTION ORAL DAILY
Refills: 0 | Status: DISCONTINUED | OUTPATIENT
Start: 2024-10-19 | End: 2024-10-23

## 2024-10-18 RX ORDER — CHLORHEXIDINE GLUCONATE 40 MG/ML
1 SOLUTION TOPICAL DAILY
Refills: 0 | Status: DISCONTINUED | OUTPATIENT
Start: 2024-10-18 | End: 2024-10-23

## 2024-10-18 RX ORDER — CHLORHEXIDINE GLUCONATE 40 MG/ML
15 SOLUTION TOPICAL EVERY 12 HOURS
Refills: 0 | Status: DISCONTINUED | OUTPATIENT
Start: 2024-10-18 | End: 2024-10-18

## 2024-10-18 RX ORDER — OXYCODONE HYDROCHLORIDE 30 MG/1
5 TABLET ORAL EVERY 4 HOURS
Refills: 0 | Status: DISCONTINUED | OUTPATIENT
Start: 2024-10-18 | End: 2024-10-23

## 2024-10-18 RX ORDER — MUPIROCIN 20 MG/G
1 OINTMENT TOPICAL
Refills: 0 | Status: DISCONTINUED | OUTPATIENT
Start: 2024-10-18 | End: 2024-10-19

## 2024-10-18 RX ORDER — ALBUMIN HUMAN 50 G/1000ML
250 SOLUTION INTRAVENOUS ONCE
Refills: 0 | Status: COMPLETED | OUTPATIENT
Start: 2024-10-18 | End: 2024-10-19

## 2024-10-18 RX ORDER — PANTOPRAZOLE SODIUM 40 MG/1
40 TABLET, DELAYED RELEASE ORAL DAILY
Refills: 0 | Status: DISCONTINUED | OUTPATIENT
Start: 2024-10-18 | End: 2024-10-19

## 2024-10-18 RX ORDER — SENNA 187 MG
2 TABLET ORAL AT BEDTIME
Refills: 0 | Status: DISCONTINUED | OUTPATIENT
Start: 2024-10-19 | End: 2024-10-23

## 2024-10-18 RX ORDER — ALBUMIN HUMAN 50 G/1000ML
250 SOLUTION INTRAVENOUS ONCE
Refills: 0 | Status: COMPLETED | OUTPATIENT
Start: 2024-10-18 | End: 2024-10-18

## 2024-10-18 RX ORDER — MAGNESIUM SULFATE IN 0.9% NACL 2 G/50 ML
2 INTRAVENOUS SOLUTION, PIGGYBACK (ML) INTRAVENOUS ONCE
Refills: 0 | Status: COMPLETED | OUTPATIENT
Start: 2024-10-18 | End: 2024-10-18

## 2024-10-18 RX ORDER — ASPIRIN/MAG CARB/ALUMINUM AMIN 325 MG
81 TABLET ORAL DAILY
Refills: 0 | Status: DISCONTINUED | OUTPATIENT
Start: 2024-10-18 | End: 2024-10-23

## 2024-10-18 RX ORDER — INSULIN REG, HUM S-S BUFF 100/ML
2 VIAL (ML) INJECTION
Qty: 50 | Refills: 0 | Status: DISCONTINUED | OUTPATIENT
Start: 2024-10-18 | End: 2024-10-19

## 2024-10-18 RX ORDER — SODIUM CHLORIDE 9 MG/ML
1000 INJECTION, SOLUTION INTRAMUSCULAR; INTRAVENOUS; SUBCUTANEOUS
Refills: 0 | Status: DISCONTINUED | OUTPATIENT
Start: 2024-10-18 | End: 2024-10-23

## 2024-10-18 RX ORDER — CEFAZOLIN SODIUM 1 G
2000 VIAL (EA) INJECTION EVERY 8 HOURS
Refills: 0 | Status: COMPLETED | OUTPATIENT
Start: 2024-10-18 | End: 2024-10-20

## 2024-10-18 RX ORDER — ACETAMINOPHEN 500 MG
1000 TABLET ORAL EVERY 6 HOURS
Refills: 0 | Status: COMPLETED | OUTPATIENT
Start: 2024-10-18 | End: 2024-10-19

## 2024-10-18 RX ORDER — HEPARIN SODIUM 10000 [USP'U]/ML
5000 INJECTION INTRAVENOUS; SUBCUTANEOUS EVERY 8 HOURS
Refills: 0 | Status: DISCONTINUED | OUTPATIENT
Start: 2024-10-18 | End: 2024-10-21

## 2024-10-18 RX ORDER — CLOPIDOGREL 75 MG/1
75 TABLET ORAL DAILY
Refills: 0 | Status: DISCONTINUED | OUTPATIENT
Start: 2024-10-18 | End: 2024-10-20

## 2024-10-18 RX ADMIN — HEPARIN SODIUM 5000 UNIT(S): 10000 INJECTION INTRAVENOUS; SUBCUTANEOUS at 21:22

## 2024-10-18 RX ADMIN — Medication 1000 MILLIGRAM(S): at 06:42

## 2024-10-18 RX ADMIN — Medication 1000 MILLIGRAM(S): at 05:42

## 2024-10-18 RX ADMIN — OXYCODONE HYDROCHLORIDE 5 MILLIGRAM(S): 30 TABLET ORAL at 23:53

## 2024-10-18 RX ADMIN — CARVEDILOL 25 MILLIGRAM(S): 25 TABLET, FILM COATED ORAL at 05:43

## 2024-10-18 RX ADMIN — Medication 2: at 07:12

## 2024-10-18 RX ADMIN — Medication 25 GRAM(S): at 20:50

## 2024-10-18 RX ADMIN — Medication 400 MILLIGRAM(S): at 23:54

## 2024-10-18 RX ADMIN — CHLORHEXIDINE GLUCONATE 10 MILLILITER(S): 40 SOLUTION TOPICAL at 05:43

## 2024-10-18 RX ADMIN — CHLORHEXIDINE GLUCONATE 1 APPLICATION(S): 40 SOLUTION TOPICAL at 05:45

## 2024-10-18 RX ADMIN — PANTOPRAZOLE SODIUM 40 MILLIGRAM(S): 40 TABLET, DELAYED RELEASE ORAL at 06:24

## 2024-10-18 RX ADMIN — Medication 100 MILLIGRAM(S): at 23:53

## 2024-10-18 RX ADMIN — SODIUM CHLORIDE 3 MILLILITER(S): 9 INJECTION, SOLUTION INTRAMUSCULAR; INTRAVENOUS; SUBCUTANEOUS at 06:29

## 2024-10-18 RX ADMIN — Medication 2000 MILLIGRAM(S): at 05:43

## 2024-10-18 RX ADMIN — ALBUMIN HUMAN 500 MILLILITER(S): 50 SOLUTION INTRAVENOUS at 21:00

## 2024-10-18 NOTE — PRE-ANESTHESIA EVALUATION ADULT - NSANTHSNORERD_ENT_A_CORE
[Follow-Up - Clinic] : a clinic follow-up of [Aortic Stenosis] : aortic stenosis [Heart Failure] : congestive heart failure [FreeTextEntry2] : discuss plan of care  [FreeTextEntry1] : Pt is a 75 y/o, female, with valvular disease of aortic stenosis, indicated on echo 12/2019 with a PV 4.72 / JEET 0.8 / MG 52.5 with EF 75%, mild MR, mid-mod TR, severe AS - stage D1, chronic diastolic HF, NYHA II, sev pHTN 63.5 mmHg, with a PMHx of Sjoren syndrome (autoimmune Dx 5-6 years ago), lupus, breast CA s/p b/l mastectomy 2010 in [facility]- no chemo, no radiation , TIAs ( 2007, 2012, 2013), Trigeminal neuralgia, smoker 1 pack day/50 years. \par Pt was hospitalized last on 12/21/20 for CP - tx for CHF /COPD. \par \par Pt presents to discuss results of TAVR work-up in planning for TAVR.\par \par CTA completed:\par Access b/l 4mm\par Carotid \par 40-59 % BRIANDA\par 60- 79 % LICA\par \par PFTs - done No

## 2024-10-18 NOTE — CONSULT NOTE ADULT - PROBLEM SELECTOR RECOMMENDATION 9
Type 2 diabetes mellitus with hyperglycemia  - Please continue lantus *** units at bedtime.   - Continue lispro *** units before each meal.  - Continue lispro moderate / low dose sliding scale before meals and at bedtime.  - Patient's fingerstick glucose goal is 100-180 mg/dL.    - For discharge, patient can ***.    - Patient can follow up at discharge with [facility] by calling [phone] to make an appointment.      Case discussed with Dr. Oseguera. Primary team updated.

## 2024-10-18 NOTE — BRIEF OPERATIVE NOTE - NSICDXBRIEFPREOP_GEN_ALL_CORE_FT
PRE-OP DIAGNOSIS:  CAD (coronary artery disease) 18-Oct-2024 12:17:08  Sulema Hernández  
PRE-OP DIAGNOSIS:  CAD (coronary artery disease) 18-Oct-2024 12:17:08  Sulema Hernández

## 2024-10-18 NOTE — PROGRESS NOTE ADULT - ASSESSMENT
79 M w/PMHx of HTN, T2DM (A1C 9%), pAF (on Eliquis), HFpEF, MI s/p PCI->OM1 in 2019 and NSTEMI 1/17/24 s/p Cardiac cath w/mid LAD stenosis s/p PTCA c/b dissection. Patient presented to [facility] on 9/30/24 with NSTEMI  s/p Cardiac cath 10/1 that revealed patent stent in OM1, 80-90% mid-distal LAD stenosis. Patient medically managed and discharged home on 10/2. Patient was referred to Dr. Patrick and presents to [facility] for pre-op optimization prior to planned MIDCAB on 10/18/24.   S/p MidCab ( LIMA-LAD ) and JOAQUIN clip  Normal EF   10/18  Arrived extubated/ HDS in Sinus  A/p   In sinus, Bps in good range/ clinically warm and euvolemic/ CVP 1 -- starting BB   Labs in good range except elevated base deficit on the ABG-- receiving colloid repletion   Good UOP/ adequate autodiuresis   Post op sugars borderline- low monitor closely as known uncontrolled T2DM - A1C 9%-- Endo consult in am   Drains quiet-- continue to monitor on suction  Continue DAPT, statin and ICU ppx with IS   OOB in am   Eliquis -- TBD on discharge currently in sinus w/ JOAQUIN clip    ATTENDING: I have personally and independently provided 105 min of critical care services. This excludes any time spent on separate procedures or teaching.      79 M w/PMHx of HTN, T2DM (A1C 9%), pAF (on Eliquis), HFpEF, MI s/p PCI->OM1 in 2019 and NSTEMI 1/17/24 s/p Cardiac cath w/mid LAD stenosis s/p PTCA c/b dissection. Patient presented to [facility] on 9/30/24 with NSTEMI  s/p Cardiac cath 10/1 that revealed patent stent in OM1, 80-90% mid-distal LAD stenosis. Patient medically managed and discharged home on 10/2. Patient was referred to Dr. Patrick and presents to [facility] for pre-op optimization prior to planned MIDCAB on 10/18/24.   S/p MidCab ( LIMA-LAD ) and JOAQUIN clip  Normal EF   10/18  Arrived extubated/ HDS in Sinus  A/p   In sinus, Bps in good range/ clinically warm and euvolemic, ABG with base deficit and CVP 1 -- receiving colloid repletion   Starting BB if not orthostatic in am   Good UOP/ adequate autodiuresis   Post op sugars borderline- low monitor closely as known uncontrolled T2DM - A1C 9%-- Endo consult in am   Drains quiet-- continue to monitor on suction  Continue DAPT, statin and ICU ppx with IS   OOB in am   Eliquis -- TBD on discharge currently in sinus w/ JOAQUIN clip    ATTENDING: I have personally and independently provided 105 min of critical care services. This excludes any time spent on separate procedures or teaching.

## 2024-10-18 NOTE — PROVIDER CONTACT NOTE (OTHER) - SITUATION
pt due for 35 units lantus insulin. blood sugar 136 at 2300. pt due for 35 units lantus insulin. NPO at midnight. blood sugar result 136 at 2341. pt due for 35 units lantus insulin. NPO at midnight. blood sugar result 136 at 2341. 35 units lantus not given per MD.

## 2024-10-18 NOTE — BRIEF OPERATIVE NOTE - NSICDXBRIEFPOSTOP_GEN_ALL_CORE_FT
POST-OP DIAGNOSIS:  CAD (coronary artery disease) 18-Oct-2024 12:17:16  Sulema Hernández V  
POST-OP DIAGNOSIS:  CAD (coronary artery disease) 18-Oct-2024 12:17:16  Sulema Hernández V  
Constitutional: No fever or chills  Eyes: No visual changes  HEENT: No throat pain  CV: No chest pain  Resp: No SOB no cough  GI: Pelvic pain  : Vag bleeding  MSK: No musculoskeletal pain  Skin: No rash  Neuro: No headache

## 2024-10-18 NOTE — BRIEF OPERATIVE NOTE - NSICDXBRIEFPROCEDURE_GEN_ALL_CORE_FT
PROCEDURES:  Minimally invasive direct coronary artery bypass (MIDCAB) with transesophageal echocardiography (ELBERT) 18-Oct-2024 12:15:39 Sulema Lazo

## 2024-10-18 NOTE — CONSULT NOTE ADULT - SUBJECTIVE AND OBJECTIVE BOX
HISTORY OF PRESENT ILLNESS  78 YO Palauan-speaking Male with PMHx of HTN, HLD, DMII (A1C 11.7%), pAF (on Eliquis), HFpEF, CKD-stage II, MI s/p PCI->OM1 in 2019 and NSTEMI 1/17/24 s/p Cardiac cath w/mid LAD stenosis s/p PTCA c/b dissection, now s/p recent NSTEMI w/ severe LAD stenosis. Patient presented to [facility] on 9/30/24 with new onset chest pain at rest. In the ED, he was ruled in for NSTEMI. 10/1/24 TTE revealed LVEF 60%, dilated ascending aorta, no significant valvular disease. 10/1/24 s/p Cardiac cath that revealed patent stent in OM1, 80-90% mid-distal LAD stenosis. Patient medically managed and discharged home on 10/2. Patient was referred to Dr. Patrick and presents to [facility] today for pre-op optimization and heparin gtt prior to planned MIDCAB on 10/18/24. At present, patient states that he feels well, denies fever, chills, chest pain, SOB, palpitations, N/V/D.    CAPILLARY BLOOD GLUCOSE & INSULIN RECEIVED  184 mg/dL (10-17 @ 18:08)  142 mg/dL (10-17 @ 21:29)  136 mg/dL (10-17 @ 23:41)  73 mg/dL (10-18 @ 03:28)  197 mg/dL (10-18 @ 04:12)  220 mg/dL (10-18 @ 06:20)  153 mg/dL (10-18 @ 06:50) - Lispro 2  95 mg/dL (10-18 @ 10:09)      · 	glipiZIDE 10 mg oral tablet: Last Dose Taken:  , 1 tab(s) orally once a day  · 	repaglinide 2 mg oral tablet: Last Dose Taken:  , 1 tab(s) orally 3 times a day  · 	Lantus 100 units/mL subcutaneous solution: Last Dose Taken:  , 40 unit(s) subcutaneous once a day (at bedtime)  · 	Invokana 300 mg oral tablet: Last Dose Taken:  , 1 tab(s) orally once a day    DIABETES HISTORY  - Age at diagnosis:   - Symptoms at time of diagnosis:   - Current Therapy:  - History of other regimens:   - History of hypoglycemia:   - History of DKA/HHS:   - Complications:   - Home FSG:        > Fasting: *** mg/dL.        > Before meals: *** mg/dL.        > Bedtime: *** mg/dL.  - Diet:          > Breakfast:         > Lunch:        > Dinner:        > Snacks:  - Physical activity:    - Outpatient follow-up:     PAST MEDICAL & SURGICAL HISTORY  As per history of present illness.     FAMILY HISTORY  - Diabetes:  - Thyroid:  - Autoimmune:  - Other:    SOCIAL HISTORY  · Social History (marital status, living situation, occupation, and sexual history)	Past smoker x 35 years, quit 20 years ago  Denies ETOH use, illicit drug use  Ambulates with cane        ALLERGIES  No Known Allergies    CURRENT MEDICATIONS  acetaminophen     Tablet .. 650 milliGRAM(s) Oral every 6 hours PRN  aspirin  chewable 81 milliGRAM(s) Oral daily  atorvastatin 80 milliGRAM(s) Oral at bedtime  carvedilol 25 milliGRAM(s) Oral every 12 hours  dextrose 5%. 1000 milliLiter(s) IV Continuous <Continuous>  dextrose 5%. 1000 milliLiter(s) IV Continuous <Continuous>  dextrose 50% Injectable 25 Gram(s) IV Push once  dextrose 50% Injectable 12.5 Gram(s) IV Push once  dextrose 50% Injectable 25 Gram(s) IV Push once  dextrose Oral Gel 15 Gram(s) Oral once PRN  DULoxetine 60 milliGRAM(s) Oral daily  glucagon  Injectable 1 milliGRAM(s) IntraMuscular once  heparin  Infusion 750 Unit(s)/Hr IV Continuous <Continuous>  influenza  Vaccine (HIGH DOSE) 0.5 milliLiter(s) IntraMuscular once  insulin glargine Injectable (LANTUS) 15 Unit(s) SubCutaneous at bedtime  insulin lispro (ADMELOG) corrective regimen sliding scale   SubCutaneous Before meals and at bedtime  insulin lispro Injectable (ADMELOG) 5 Unit(s) SubCutaneous before dinner  mupirocin 2% Ointment 1 Application(s) Both Nostrils once  pantoprazole    Tablet 40 milliGRAM(s) Oral before breakfast  sodium chloride 0.9% lock flush 3 milliLiter(s) IV Push every 8 hours  tamsulosin 0.4 milliGRAM(s) Oral at bedtime    REVIEW OF SYSTEMS  Constitutional:  Negative fever, chills or loss of appetite.  Eyes:  Negative blurry vision or double vision.  Cardiovascular:  Negative for chest pain or palpitations.  Respiratory:  Negative for cough, wheezing, or shortness of breath.   Gastrointestinal:  Negative for nausea, vomiting, diarrhea, constipation, or abdominal pain.  Genitourinary:  Negative frequency, urgency or dysuria.  Neurologic:  No headache, confusion, dizziness, lightheadedness.    PHYSICAL EXAM  Vital Signs Last 24 Hrs  T(C): 36.3 (18 Oct 2024 07:33), Max: 36.8 (17 Oct 2024 17:46)  T(F): 97.3 (18 Oct 2024 07:33), Max: 98.3 (17 Oct 2024 17:46)  HR: 65 (18 Oct 2024 10:02) (65 - 82)  BP: 156/80 (18 Oct 2024 10:02) (107/66 - 166/74)  BP(mean): 111 (18 Oct 2024 10:02) (81 - 111)  RR: 18 (18 Oct 2024 10:02) (16 - 18)  SpO2: 97% (18 Oct 2024 10:02) (96% - 99%)    Parameters below as of 18 Oct 2024 10:02  Patient On (Oxygen Delivery Method): room air    Constitutional: Awake, alert, in no acute distress.   HEENT: Normocephalic, atraumatic, ALVERTO, no proptosis or lid retraction.   Neck: supple, no acanthosis, no thyromegaly or palpable thyroid nodules.  Respiratory: Lungs clear to ausculation bilaterally.   Cardiovascular: regular rhythm, normal S1 and S2, no audible murmurs.   GI: soft, non-tender, non-distended, bowel sounds present, no masses appreciated.  Extremities: No lower extremity edema, peripheral pulses present.   Skin: no rashes.   Psychiatric: AAO x 3. Normal affect/mood.     LABS  CBC - WBC/HGB/HTC/PLT: 11.12/14.3/43.5/244 (10-18-24)  BMP: Na/K/Cl/Bicarb/BUN/Cr/Gluc: 136/4.1/103/23/28/0.88/220 (10-18-24)  Anion Gap: 10 (10-18-24)  eGFR: 89 (10-18-24)  Calcium: 9.1 (10-18-24)  Phosphorus: -- (10-18-24)  Magnesium: 2.3 (10-18-24)  LFT - Alb/Tprot/Tbili/Dbili/AlkPhos/ALT/AST: 4.0/--/0.6/--/123/25/20 (10-17-24)  PT/aPTT/INR: 11.6/76.7/1.01 (10-18-24)  Thyroid Stimulating Hormone, Serum: 4.250 (10-17-24)     HISTORY OF PRESENT ILLNESS  78 YO Lebanese-speaking Male with PMHx of HTN, HLD, DMII (A1C 11.7%), pAF (on Eliquis), HFpEF, CKD-stage II, MI s/p PCI->OM1 in 2019 and NSTEMI 1/17/24 s/p Cardiac cath w/mid LAD stenosis s/p PTCA c/b dissection, now s/p recent NSTEMI w/ severe LAD stenosis. Patient presented to [facility] on 9/30/24 with new onset chest pain at rest. In the ED, he was ruled in for NSTEMI. 10/1/24 TTE revealed LVEF 60%, dilated ascending aorta, no significant valvular disease. 10/1/24 s/p Cardiac cath that revealed patent stent in OM1, 80-90% mid-distal LAD stenosis. Patient medically managed and discharged home on 10/2. Now s/p MIDCAB on 10/18/2024.    CAPILLARY BLOOD GLUCOSE & INSULIN RECEIVED  184 mg/dL (10-17 @ 18:08)  142 mg/dL (10-17 @ 21:29)  136 mg/dL (10-17 @ 23:41)  73 mg/dL (10-18 @ 03:28)  197 mg/dL (10-18 @ 04:12)  220 mg/dL (10-18 @ 06:20)  153 mg/dL (10-18 @ 06:50) - Lispro 2  95 mg/dL (10-18 @ 10:09)      · 	glipiZIDE 10 mg oral tablet: Last Dose Taken:  , 1 tab(s) orally once a day  · 	repaglinide 2 mg oral tablet: Last Dose Taken:  , 1 tab(s) orally 3 times a day  · 	Lantus 100 units/mL subcutaneous solution: Last Dose Taken:  , 40 unit(s) subcutaneous once a day (at bedtime)  · 	Invokana 300 mg oral tablet: Last Dose Taken:  , 1 tab(s) orally once a day    DIABETES HISTORY  - Age at diagnosis:   - Symptoms at time of diagnosis:   - Current Therapy:  - History of other regimens:   - History of hypoglycemia:   - History of DKA/HHS:   - Complications:   - Home FSG:        > Fasting: *** mg/dL.        > Before meals: *** mg/dL.        > Bedtime: *** mg/dL.  - Diet:          > Breakfast:         > Lunch:        > Dinner:        > Snacks:  - Physical activity:    - Outpatient follow-up:     PAST MEDICAL & SURGICAL HISTORY  As per history of present illness.     FAMILY HISTORY  - Diabetes:  - Thyroid:  - Autoimmune:  - Other:    SOCIAL HISTORY  · Social History (marital status, living situation, occupation, and sexual history)	Past smoker x 35 years, quit 20 years ago  Denies ETOH use, illicit drug use  Ambulates with cane        ALLERGIES  No Known Allergies    CURRENT MEDICATIONS  acetaminophen     Tablet .. 650 milliGRAM(s) Oral every 6 hours PRN  aspirin  chewable 81 milliGRAM(s) Oral daily  atorvastatin 80 milliGRAM(s) Oral at bedtime  carvedilol 25 milliGRAM(s) Oral every 12 hours  dextrose 5%. 1000 milliLiter(s) IV Continuous <Continuous>  dextrose 5%. 1000 milliLiter(s) IV Continuous <Continuous>  dextrose 50% Injectable 25 Gram(s) IV Push once  dextrose 50% Injectable 12.5 Gram(s) IV Push once  dextrose 50% Injectable 25 Gram(s) IV Push once  dextrose Oral Gel 15 Gram(s) Oral once PRN  DULoxetine 60 milliGRAM(s) Oral daily  glucagon  Injectable 1 milliGRAM(s) IntraMuscular once  heparin  Infusion 750 Unit(s)/Hr IV Continuous <Continuous>  influenza  Vaccine (HIGH DOSE) 0.5 milliLiter(s) IntraMuscular once  insulin glargine Injectable (LANTUS) 15 Unit(s) SubCutaneous at bedtime  insulin lispro (ADMELOG) corrective regimen sliding scale   SubCutaneous Before meals and at bedtime  insulin lispro Injectable (ADMELOG) 5 Unit(s) SubCutaneous before dinner  mupirocin 2% Ointment 1 Application(s) Both Nostrils once  pantoprazole    Tablet 40 milliGRAM(s) Oral before breakfast  sodium chloride 0.9% lock flush 3 milliLiter(s) IV Push every 8 hours  tamsulosin 0.4 milliGRAM(s) Oral at bedtime    REVIEW OF SYSTEMS  Constitutional:  Negative fever, chills or loss of appetite.  Eyes:  Negative blurry vision or double vision.  Cardiovascular:  Negative for chest pain or palpitations.  Respiratory:  Negative for cough, wheezing, or shortness of breath.   Gastrointestinal:  Negative for nausea, vomiting, diarrhea, constipation, or abdominal pain.  Genitourinary:  Negative frequency, urgency or dysuria.  Neurologic:  No headache, confusion, dizziness, lightheadedness.    PHYSICAL EXAM  Vital Signs Last 24 Hrs  T(C): 36.3 (18 Oct 2024 07:33), Max: 36.8 (17 Oct 2024 17:46)  T(F): 97.3 (18 Oct 2024 07:33), Max: 98.3 (17 Oct 2024 17:46)  HR: 65 (18 Oct 2024 10:02) (65 - 82)  BP: 156/80 (18 Oct 2024 10:02) (107/66 - 166/74)  BP(mean): 111 (18 Oct 2024 10:02) (81 - 111)  RR: 18 (18 Oct 2024 10:02) (16 - 18)  SpO2: 97% (18 Oct 2024 10:02) (96% - 99%)    Parameters below as of 18 Oct 2024 10:02  Patient On (Oxygen Delivery Method): room air    Constitutional: Awake, alert, in no acute distress.   HEENT: Normocephalic, atraumatic, ALVERTO, no proptosis or lid retraction.   Neck: supple, no acanthosis, no thyromegaly or palpable thyroid nodules.  Respiratory: Lungs clear to ausculation bilaterally.   Cardiovascular: regular rhythm, normal S1 and S2, no audible murmurs.   GI: soft, non-tender, non-distended, bowel sounds present, no masses appreciated.  Extremities: No lower extremity edema, peripheral pulses present.   Skin: no rashes.   Psychiatric: AAO x 3. Normal affect/mood.     LABS  CBC - WBC/HGB/HTC/PLT: 11.12/14.3/43.5/244 (10-18-24)  BMP: Na/K/Cl/Bicarb/BUN/Cr/Gluc: 136/4.1/103/23/28/0.88/220 (10-18-24)  Anion Gap: 10 (10-18-24)  eGFR: 89 (10-18-24)  Calcium: 9.1 (10-18-24)  Phosphorus: -- (10-18-24)  Magnesium: 2.3 (10-18-24)  LFT - Alb/Tprot/Tbili/Dbili/AlkPhos/ALT/AST: 4.0/--/0.6/--/123/25/20 (10-17-24)  PT/aPTT/INR: 11.6/76.7/1.01 (10-18-24)  Thyroid Stimulating Hormone, Serum: 4.250 (10-17-24)

## 2024-10-18 NOTE — PROVIDER CONTACT NOTE (OTHER) - ASSESSMENT
Blood sugar rechecked at 0330- result 73. MD made aware. Ensure clear administered. Blood sugar rechecked at 0330- result 73. MD made aware. Ensure clear administered per orders. Blood sugar rechecked at 0330- result 73. MD made aware. Ensure clear administered per orders.    Blood sugar rechecked at 0415- result 197. MD made aware

## 2024-10-18 NOTE — PROGRESS NOTE ADULT - SUBJECTIVE AND OBJECTIVE BOX
INTERVAL COURSE  POD#0   MidCAB ( LIMA- LAD) Normal EF    Arrived extubated on no drips  Alert and awake, nonfocal   Labs stable and in good range   Drains quiet     VITALS  Vital Signs Last 24 Hrs  T(C): 36.2 (18 Oct 2024 23:00), Max: 36.3 (18 Oct 2024 01:01)  T(F): 97.1 (18 Oct 2024 23:00), Max: 97.3 (18 Oct 2024 01:01)  HR: 95 (18 Oct 2024 23:30) (65 - 96)  BP: 156/80 (18 Oct 2024 13:16) (107/66 - 156/80)  BP(mean): 111 (18 Oct 2024 13:16) (81 - 111)  RR: 18 (18 Oct 2024 23:30) (16 - 20)  SpO2: 100% (18 Oct 2024 23:30) (96% - 100%)    Parameters below as of 18 Oct 2024 23:30  Patient On (Oxygen Delivery Method): nasal cannula w/ humidification  O2 Flow (L/min): 6      I&O's Summary  17 Oct 2024 07:01  -  18 Oct 2024 07:00  --------------------------------------------------------  IN: 439.5 mL / OUT: 1050 mL / NET: -610.5 mL    PHYSICAL EXAM  General: A&Ox 3; NAD  Respiratory: CTA B/L; no wheezes  Cardiovascular: Regular rhythm/rate  Gastrointestinal: Soft; NTND   Extremities: WWP; no edema   Neurological:  CNII-XII grossly intact; no obvious focal deficits      LABS/IMAGING/EKG/ETC  Reviewed.

## 2024-10-18 NOTE — CONSULT NOTE ADULT - ASSESSMENT
80 YO Filipino-speaking Male with PMHx of HTN, HLD, DMII (A1C 11.7%), pAF (on Eliquis), HFpEF, CKD-stage II, MI s/p PCI->OM1 in 2019 and NSTEMI 1/17/24 s/p Cardiac cath w/mid LAD stenosis s/p PTCA c/b dissection, now s/p recent NSTEMI w/ severe LAD stenosis. Patient presented to [facility] on 9/30/24 with new onset chest pain at rest. In the ED, he was ruled in for NSTEMI. 10/1/24 TTE revealed LVEF 60%, dilated ascending aorta, no significant valvular disease. 10/1/24 s/p Cardiac cath that revealed patent stent in OM1, 80-90% mid-distal LAD stenosis. Patient medically managed and discharged home on 10/2. Patient was referred to Dr. Patrick and presents to [facility] today for pre-op optimization and heparin gtt prior to planned MIDCAB on 10/18/24.     Endocrine is consulted for diabetes management (new, chronic, stable, exacerbated).    24 hour glucose data reviewed.  3 labs reviewed - normal/abnormal  Insulin adjustment    A1C: 9.1 %  BUN: 28  Creatinine: 0.88  GFR: 89  Weight: 65.3  BMI: 23.2  EF:  80 YO Kuwaiti-speaking Male with PMHx of HTN, HLD, DMII (A1C 11.7%), pAF (on Eliquis), HFpEF, CKD-stage II, MI s/p PCI->OM1 in 2019 and NSTEMI 1/17/24 s/p Cardiac cath w/mid LAD stenosis s/p PTCA c/b dissection, now s/p recent NSTEMI w/ severe LAD stenosis. Patient presented to [facility] on 9/30/24 with new onset chest pain at rest. In the ED, he was ruled in for NSTEMI. 10/1/24 TTE revealed LVEF 60%, dilated ascending aorta, no significant valvular disease. 10/1/24 s/p Cardiac cath that revealed patent stent in OM1, 80-90% mid-distal LAD stenosis. Patient medically managed and discharged home on 10/2. Now s/p MIDCAB on 10/18/2024.    Endocrine is consulted for diabetes management (new, chronic, stable, exacerbated).    24 hour glucose data reviewed.  3 labs reviewed - normal/abnormal  Insulin adjustment    A1C: 9.1 %  BUN: 28  Creatinine: 0.88  GFR: 89  Weight: 65.3  BMI: 23.2  EF:

## 2024-10-19 LAB
ALBUMIN SERPL ELPH-MCNC: 3.7 G/DL — SIGNIFICANT CHANGE UP (ref 3.3–5)
ALBUMIN SERPL ELPH-MCNC: 4.3 G/DL — SIGNIFICANT CHANGE UP (ref 3.3–5)
ALP SERPL-CCNC: 92 U/L — SIGNIFICANT CHANGE UP (ref 40–120)
ALP SERPL-CCNC: 99 U/L — SIGNIFICANT CHANGE UP (ref 40–120)
ALT FLD-CCNC: 20 U/L — SIGNIFICANT CHANGE UP (ref 10–45)
ALT FLD-CCNC: 26 U/L — SIGNIFICANT CHANGE UP (ref 10–45)
ANION GAP SERPL CALC-SCNC: 12 MMOL/L — SIGNIFICANT CHANGE UP (ref 5–17)
ANION GAP SERPL CALC-SCNC: 14 MMOL/L — SIGNIFICANT CHANGE UP (ref 5–17)
APTT BLD: 33.1 SEC — SIGNIFICANT CHANGE UP (ref 24.5–35.6)
APTT BLD: 38.6 SEC — HIGH (ref 24.5–35.6)
AST SERPL-CCNC: 26 U/L — SIGNIFICANT CHANGE UP (ref 10–40)
AST SERPL-CCNC: 32 U/L — SIGNIFICANT CHANGE UP (ref 10–40)
BASOPHILS # BLD AUTO: 0 K/UL — SIGNIFICANT CHANGE UP (ref 0–0.2)
BASOPHILS # BLD AUTO: 0.02 K/UL — SIGNIFICANT CHANGE UP (ref 0–0.2)
BASOPHILS NFR BLD AUTO: 0 % — SIGNIFICANT CHANGE UP (ref 0–2)
BASOPHILS NFR BLD AUTO: 0.1 % — SIGNIFICANT CHANGE UP (ref 0–2)
BILIRUB SERPL-MCNC: 0.7 MG/DL — SIGNIFICANT CHANGE UP (ref 0.2–1.2)
BILIRUB SERPL-MCNC: 0.7 MG/DL — SIGNIFICANT CHANGE UP (ref 0.2–1.2)
BUN SERPL-MCNC: 23 MG/DL — SIGNIFICANT CHANGE UP (ref 7–23)
BUN SERPL-MCNC: 28 MG/DL — HIGH (ref 7–23)
BURR CELLS BLD QL SMEAR: PRESENT — SIGNIFICANT CHANGE UP
CALCIUM SERPL-MCNC: 8.2 MG/DL — LOW (ref 8.4–10.5)
CALCIUM SERPL-MCNC: 8.3 MG/DL — LOW (ref 8.4–10.5)
CHLORIDE SERPL-SCNC: 100 MMOL/L — SIGNIFICANT CHANGE UP (ref 96–108)
CHLORIDE SERPL-SCNC: 98 MMOL/L — SIGNIFICANT CHANGE UP (ref 96–108)
CO2 SERPL-SCNC: 20 MMOL/L — LOW (ref 22–31)
CO2 SERPL-SCNC: 21 MMOL/L — LOW (ref 22–31)
CREAT SERPL-MCNC: 0.73 MG/DL — SIGNIFICANT CHANGE UP (ref 0.5–1.3)
CREAT SERPL-MCNC: 0.78 MG/DL — SIGNIFICANT CHANGE UP (ref 0.5–1.3)
EGFR: 92 ML/MIN/1.73M2 — SIGNIFICANT CHANGE UP
EGFR: 94 ML/MIN/1.73M2 — SIGNIFICANT CHANGE UP
EOSINOPHIL # BLD AUTO: 0 K/UL — SIGNIFICANT CHANGE UP (ref 0–0.5)
EOSINOPHIL # BLD AUTO: 0 K/UL — SIGNIFICANT CHANGE UP (ref 0–0.5)
EOSINOPHIL NFR BLD AUTO: 0 % — SIGNIFICANT CHANGE UP (ref 0–6)
EOSINOPHIL NFR BLD AUTO: 0 % — SIGNIFICANT CHANGE UP (ref 0–6)
GAS PNL BLDA: SIGNIFICANT CHANGE UP
GAS PNL BLDA: SIGNIFICANT CHANGE UP
GLUCOSE BLDC GLUCOMTR-MCNC: 121 MG/DL — HIGH (ref 70–99)
GLUCOSE BLDC GLUCOMTR-MCNC: 225 MG/DL — HIGH (ref 70–99)
GLUCOSE BLDC GLUCOMTR-MCNC: 239 MG/DL — HIGH (ref 70–99)
GLUCOSE BLDC GLUCOMTR-MCNC: 295 MG/DL — HIGH (ref 70–99)
GLUCOSE SERPL-MCNC: 254 MG/DL — HIGH (ref 70–99)
GLUCOSE SERPL-MCNC: 91 MG/DL — SIGNIFICANT CHANGE UP (ref 70–99)
HCT VFR BLD CALC: 35.9 % — LOW (ref 39–50)
HCT VFR BLD CALC: 36.7 % — LOW (ref 39–50)
HGB BLD-MCNC: 12.1 G/DL — LOW (ref 13–17)
HGB BLD-MCNC: 12.1 G/DL — LOW (ref 13–17)
IMM GRANULOCYTES NFR BLD AUTO: 0.3 % — SIGNIFICANT CHANGE UP (ref 0–0.9)
INR BLD: 1.15 — SIGNIFICANT CHANGE UP (ref 0.85–1.16)
INR BLD: 1.33 — HIGH (ref 0.85–1.16)
LYMPHOCYTES # BLD AUTO: 0.5 K/UL — LOW (ref 1–3.3)
LYMPHOCYTES # BLD AUTO: 0.75 K/UL — LOW (ref 1–3.3)
LYMPHOCYTES # BLD AUTO: 3.5 % — LOW (ref 13–44)
LYMPHOCYTES # BLD AUTO: 5.4 % — LOW (ref 13–44)
MAGNESIUM SERPL-MCNC: 2.4 MG/DL — SIGNIFICANT CHANGE UP (ref 1.6–2.6)
MANUAL SMEAR VERIFICATION: SIGNIFICANT CHANGE UP
MCHC RBC-ENTMCNC: 27.1 PG — SIGNIFICANT CHANGE UP (ref 27–34)
MCHC RBC-ENTMCNC: 28.3 PG — SIGNIFICANT CHANGE UP (ref 27–34)
MCHC RBC-ENTMCNC: 33 GM/DL — SIGNIFICANT CHANGE UP (ref 32–36)
MCHC RBC-ENTMCNC: 33.7 GM/DL — SIGNIFICANT CHANGE UP (ref 32–36)
MCV RBC AUTO: 82.1 FL — SIGNIFICANT CHANGE UP (ref 80–100)
MCV RBC AUTO: 84.1 FL — SIGNIFICANT CHANGE UP (ref 80–100)
MONOCYTES # BLD AUTO: 0.65 K/UL — SIGNIFICANT CHANGE UP (ref 0–0.9)
MONOCYTES # BLD AUTO: 1.6 K/UL — HIGH (ref 0–0.9)
MONOCYTES NFR BLD AUTO: 11.3 % — SIGNIFICANT CHANGE UP (ref 2–14)
MONOCYTES NFR BLD AUTO: 4.7 % — SIGNIFICANT CHANGE UP (ref 2–14)
NEUTROPHILS # BLD AUTO: 12.08 K/UL — HIGH (ref 1.8–7.4)
NEUTROPHILS # BLD AUTO: 12.38 K/UL — HIGH (ref 1.8–7.4)
NEUTROPHILS NFR BLD AUTO: 85.2 % — HIGH (ref 43–77)
NEUTROPHILS NFR BLD AUTO: 89.5 % — HIGH (ref 43–77)
NRBC # BLD: 0 /100 WBCS — SIGNIFICANT CHANGE UP (ref 0–0)
OVALOCYTES BLD QL SMEAR: SLIGHT — SIGNIFICANT CHANGE UP
PHOSPHATE SERPL-MCNC: 4.3 MG/DL — SIGNIFICANT CHANGE UP (ref 2.5–4.5)
PLAT MORPH BLD: ABNORMAL
PLATELET # BLD AUTO: 241 K/UL — SIGNIFICANT CHANGE UP (ref 150–400)
PLATELET # BLD AUTO: 248 K/UL — SIGNIFICANT CHANGE UP (ref 150–400)
POIKILOCYTOSIS BLD QL AUTO: SIGNIFICANT CHANGE UP
POTASSIUM SERPL-MCNC: 3.8 MMOL/L — SIGNIFICANT CHANGE UP (ref 3.5–5.3)
POTASSIUM SERPL-MCNC: 4.6 MMOL/L — SIGNIFICANT CHANGE UP (ref 3.5–5.3)
POTASSIUM SERPL-SCNC: 3.8 MMOL/L — SIGNIFICANT CHANGE UP (ref 3.5–5.3)
POTASSIUM SERPL-SCNC: 4.6 MMOL/L — SIGNIFICANT CHANGE UP (ref 3.5–5.3)
PROT SERPL-MCNC: 6.8 G/DL — SIGNIFICANT CHANGE UP (ref 6–8.3)
PROT SERPL-MCNC: 7.2 G/DL — SIGNIFICANT CHANGE UP (ref 6–8.3)
PROTHROM AB SERPL-ACNC: 13.4 SEC — SIGNIFICANT CHANGE UP (ref 9.9–13.4)
PROTHROM AB SERPL-ACNC: 15.2 SEC — HIGH (ref 9.9–13.4)
RBC # BLD: 4.27 M/UL — SIGNIFICANT CHANGE UP (ref 4.2–5.8)
RBC # BLD: 4.47 M/UL — SIGNIFICANT CHANGE UP (ref 4.2–5.8)
RBC # FLD: 12.5 % — SIGNIFICANT CHANGE UP (ref 10.3–14.5)
RBC # FLD: 13.1 % — SIGNIFICANT CHANGE UP (ref 10.3–14.5)
RBC BLD AUTO: ABNORMAL
SODIUM SERPL-SCNC: 132 MMOL/L — LOW (ref 135–145)
SODIUM SERPL-SCNC: 133 MMOL/L — LOW (ref 135–145)
SPHEROCYTES BLD QL SMEAR: SLIGHT — SIGNIFICANT CHANGE UP
WBC # BLD: 13.84 K/UL — HIGH (ref 3.8–10.5)
WBC # BLD: 14.18 K/UL — HIGH (ref 3.8–10.5)
WBC # FLD AUTO: 13.84 K/UL — HIGH (ref 3.8–10.5)
WBC # FLD AUTO: 14.18 K/UL — HIGH (ref 3.8–10.5)

## 2024-10-19 PROCEDURE — 99222 1ST HOSP IP/OBS MODERATE 55: CPT

## 2024-10-19 PROCEDURE — 99233 SBSQ HOSP IP/OBS HIGH 50: CPT

## 2024-10-19 PROCEDURE — 71045 X-RAY EXAM CHEST 1 VIEW: CPT | Mod: 26

## 2024-10-19 RX ORDER — METOPROLOL TARTRATE 50 MG
25 TABLET ORAL EVERY 8 HOURS
Refills: 0 | Status: DISCONTINUED | OUTPATIENT
Start: 2024-10-19 | End: 2024-10-21

## 2024-10-19 RX ORDER — INSULIN LISPRO 100/ML
VIAL (ML) SUBCUTANEOUS
Refills: 0 | Status: DISCONTINUED | OUTPATIENT
Start: 2024-10-19 | End: 2024-10-23

## 2024-10-19 RX ORDER — MUPIROCIN 20 MG/G
1 OINTMENT TOPICAL
Refills: 0 | Status: DISCONTINUED | OUTPATIENT
Start: 2024-10-19 | End: 2024-10-23

## 2024-10-19 RX ORDER — ALBUMIN HUMAN 50 G/1000ML
250 SOLUTION INTRAVENOUS ONCE
Refills: 0 | Status: COMPLETED | OUTPATIENT
Start: 2024-10-19 | End: 2024-10-19

## 2024-10-19 RX ORDER — INSULIN LISPRO 100/ML
5 VIAL (ML) SUBCUTANEOUS
Refills: 0 | Status: DISCONTINUED | OUTPATIENT
Start: 2024-10-19 | End: 2024-10-20

## 2024-10-19 RX ORDER — ASCORBIC ACID 500 MG
500 TABLET ORAL DAILY
Refills: 0 | Status: DISCONTINUED | OUTPATIENT
Start: 2024-10-19 | End: 2024-10-23

## 2024-10-19 RX ORDER — METOPROLOL TARTRATE 50 MG
12.5 TABLET ORAL ONCE
Refills: 0 | Status: COMPLETED | OUTPATIENT
Start: 2024-10-19 | End: 2024-10-19

## 2024-10-19 RX ORDER — POTASSIUM CHLORIDE 10 MEQ
20 TABLET, EXTENDED RELEASE ORAL ONCE
Refills: 0 | Status: COMPLETED | OUTPATIENT
Start: 2024-10-19 | End: 2024-10-19

## 2024-10-19 RX ORDER — GLUCAGON INJECTION, SOLUTION 1 MG/.2ML
1 INJECTION, SOLUTION SUBCUTANEOUS ONCE
Refills: 0 | Status: DISCONTINUED | OUTPATIENT
Start: 2024-10-19 | End: 2024-10-23

## 2024-10-19 RX ORDER — TAMSULOSIN HCL 0.4 MG
0.4 CAPSULE ORAL AT BEDTIME
Refills: 0 | Status: DISCONTINUED | OUTPATIENT
Start: 2024-10-19 | End: 2024-10-23

## 2024-10-19 RX ORDER — GABAPENTIN 300 MG/1
100 CAPSULE ORAL EVERY 8 HOURS
Refills: 0 | Status: DISCONTINUED | OUTPATIENT
Start: 2024-10-19 | End: 2024-10-23

## 2024-10-19 RX ORDER — INSULIN GLARGINE,HUM.REC.ANLOG 100/ML
10 VIAL (ML) SUBCUTANEOUS AT BEDTIME
Refills: 0 | Status: DISCONTINUED | OUTPATIENT
Start: 2024-10-19 | End: 2024-10-20

## 2024-10-19 RX ORDER — PANTOPRAZOLE SODIUM 40 MG/1
40 TABLET, DELAYED RELEASE ORAL
Refills: 0 | Status: DISCONTINUED | OUTPATIENT
Start: 2024-10-19 | End: 2024-10-23

## 2024-10-19 RX ORDER — METOPROLOL TARTRATE 50 MG
12.5 TABLET ORAL EVERY 8 HOURS
Refills: 0 | Status: DISCONTINUED | OUTPATIENT
Start: 2024-10-19 | End: 2024-10-19

## 2024-10-19 RX ORDER — SODIUM CHLORIDE 9 MG/ML
3 INJECTION, SOLUTION INTRAMUSCULAR; INTRAVENOUS; SUBCUTANEOUS EVERY 8 HOURS
Refills: 0 | Status: DISCONTINUED | OUTPATIENT
Start: 2024-10-19 | End: 2024-10-23

## 2024-10-19 RX ADMIN — OXYCODONE HYDROCHLORIDE 5 MILLIGRAM(S): 30 TABLET ORAL at 22:30

## 2024-10-19 RX ADMIN — Medication 1000 MILLIGRAM(S): at 00:25

## 2024-10-19 RX ADMIN — HEPARIN SODIUM 5000 UNIT(S): 10000 INJECTION INTRAVENOUS; SUBCUTANEOUS at 22:07

## 2024-10-19 RX ADMIN — Medication 12.5 MILLIGRAM(S): at 05:35

## 2024-10-19 RX ADMIN — GABAPENTIN 100 MILLIGRAM(S): 300 CAPSULE ORAL at 22:06

## 2024-10-19 RX ADMIN — POLYETHYLENE GLYCOL 3350 17 GRAM(S): 17 POWDER, FOR SOLUTION ORAL at 11:10

## 2024-10-19 RX ADMIN — CLOPIDOGREL 75 MILLIGRAM(S): 75 TABLET ORAL at 11:09

## 2024-10-19 RX ADMIN — OXYCODONE HYDROCHLORIDE 5 MILLIGRAM(S): 30 TABLET ORAL at 22:05

## 2024-10-19 RX ADMIN — HEPARIN SODIUM 5000 UNIT(S): 10000 INJECTION INTRAVENOUS; SUBCUTANEOUS at 05:35

## 2024-10-19 RX ADMIN — OXYCODONE HYDROCHLORIDE 5 MILLIGRAM(S): 30 TABLET ORAL at 13:54

## 2024-10-19 RX ADMIN — MUPIROCIN 1 APPLICATION(S): 20 OINTMENT TOPICAL at 05:35

## 2024-10-19 RX ADMIN — OXYCODONE HYDROCHLORIDE 5 MILLIGRAM(S): 30 TABLET ORAL at 00:25

## 2024-10-19 RX ADMIN — Medication 400 MILLIGRAM(S): at 05:34

## 2024-10-19 RX ADMIN — Medication 10 UNIT(S): at 22:19

## 2024-10-19 RX ADMIN — Medication 0.4 MILLIGRAM(S): at 22:06

## 2024-10-19 RX ADMIN — Medication 12.5 MILLIGRAM(S): at 13:55

## 2024-10-19 RX ADMIN — MUPIROCIN 1 APPLICATION(S): 20 OINTMENT TOPICAL at 18:29

## 2024-10-19 RX ADMIN — Medication 80 MILLIGRAM(S): at 22:06

## 2024-10-19 RX ADMIN — ALBUMIN HUMAN 125 MILLILITER(S): 50 SOLUTION INTRAVENOUS at 00:16

## 2024-10-19 RX ADMIN — Medication 500 MILLIGRAM(S): at 11:09

## 2024-10-19 RX ADMIN — Medication 4: at 22:07

## 2024-10-19 RX ADMIN — Medication 12.5 MILLIGRAM(S): at 17:39

## 2024-10-19 RX ADMIN — Medication 400 MILLIGRAM(S): at 11:10

## 2024-10-19 RX ADMIN — Medication 4: at 12:21

## 2024-10-19 RX ADMIN — OXYCODONE HYDROCHLORIDE 5 MILLIGRAM(S): 30 TABLET ORAL at 04:51

## 2024-10-19 RX ADMIN — Medication 1000 MILLIGRAM(S): at 05:55

## 2024-10-19 RX ADMIN — Medication 5 UNIT(S): at 12:22

## 2024-10-19 RX ADMIN — Medication 100 MILLIEQUIVALENT(S): at 03:30

## 2024-10-19 RX ADMIN — Medication 400 MILLIGRAM(S): at 17:39

## 2024-10-19 RX ADMIN — Medication 5 UNIT(S): at 17:08

## 2024-10-19 RX ADMIN — Medication 81 MILLIGRAM(S): at 11:08

## 2024-10-19 RX ADMIN — Medication 6: at 17:08

## 2024-10-19 RX ADMIN — GABAPENTIN 100 MILLIGRAM(S): 300 CAPSULE ORAL at 13:55

## 2024-10-19 RX ADMIN — SODIUM CHLORIDE 3 MILLILITER(S): 9 INJECTION, SOLUTION INTRAMUSCULAR; INTRAVENOUS; SUBCUTANEOUS at 22:23

## 2024-10-19 RX ADMIN — ALBUMIN HUMAN 125 MILLILITER(S): 50 SOLUTION INTRAVENOUS at 17:38

## 2024-10-19 RX ADMIN — GABAPENTIN 100 MILLIGRAM(S): 300 CAPSULE ORAL at 05:34

## 2024-10-19 RX ADMIN — OXYCODONE HYDROCHLORIDE 5 MILLIGRAM(S): 30 TABLET ORAL at 05:25

## 2024-10-19 RX ADMIN — Medication 25 MILLIGRAM(S): at 22:06

## 2024-10-19 RX ADMIN — HEPARIN SODIUM 5000 UNIT(S): 10000 INJECTION INTRAVENOUS; SUBCUTANEOUS at 13:55

## 2024-10-19 RX ADMIN — CHLORHEXIDINE GLUCONATE 1 APPLICATION(S): 40 SOLUTION TOPICAL at 06:00

## 2024-10-19 RX ADMIN — Medication 100 MILLIGRAM(S): at 17:40

## 2024-10-19 RX ADMIN — Medication 100 MILLIGRAM(S): at 07:44

## 2024-10-19 RX ADMIN — Medication 2 TABLET(S): at 22:07

## 2024-10-19 NOTE — PROGRESS NOTE ADULT - SUBJECTIVE AND OBJECTIVE BOX
CTICU  CRITICAL  CARE  attending     Hand off received 					   Pertinent clinical, laboratory, radiographic, hemodynamic, echocardiographic, respiratory data, microbiologic data and chart were reviewed and analyzed frequently throughout the course of the day  Patient seen and examined with CTS/ SH attending at bedside  Pt is a 79yr old male with PMH HTN, HLD, DM (A1C 9.1), afib on eliquis, CKD, CAD sp PCI with ENZO to OM1 2019, NSTEMI 1/17/24 sp cardiac cath with PTCA cb dissection, recent cath (10/1/24) with 80-90% stenosis admitted for surgical mgmt. Pt underwent midCABG (LIMA-LAD) with JOAQUIN clip, nl EF with Dr. Patrick 10/19/24. Arrived extubated on no gtts. BBlocker started overnight.     FAMILY HISTORY:  PAST MEDICAL & SURGICAL HISTORY:  HTN (hypertension)  HLD (hyperlipidemia)  CAD (coronary artery disease)  DM (diabetes mellitus)  Stage 2 chronic kidney disease  Atrial fibrillation  NSTEMI (non-ST elevation myocardial infarction)        Patient is a 79y old  Male who presents with a chief complaint of MIDCAB (19 Oct 2024 09:10)      14 system review was unremarkable    Vital signs, hemodynamic and respiratory parameters were reviewed from the bedside nursing flowsheet.  ICU Vital Signs Last 24 Hrs  T(C): 36.1 (19 Oct 2024 09:49), Max: 36.4 (19 Oct 2024 01:01)  T(F): 97 (19 Oct 2024 09:49), Max: 97.6 (19 Oct 2024 05:09)  HR: 93 (19 Oct 2024 11:00) (65 - 104)  BP: 156/80 (18 Oct 2024 13:16) (156/80 - 156/80)  BP(mean): 111 (18 Oct 2024 13:16) (111 - 111)  ABP: 163/71 (19 Oct 2024 11:00) (117/59 - 163/71)  ABP(mean): 105 (19 Oct 2024 11:00) (83 - 106)  RR: 18 (19 Oct 2024 11:00) (16 - 20)  SpO2: 97% (19 Oct 2024 11:00) (96% - 100%)    O2 Parameters below as of 19 Oct 2024 11:00  Patient On (Oxygen Delivery Method): room air          Adult Advanced Hemodynamics Last 24 Hrs  CVP(mm Hg): 7 (19 Oct 2024 11:00) (-5 - 256)  CVP(cm H2O): --  CO: --  CI: --  PA: --  PA(mean): --  PCWP: --  SVR: --  SVRI: --  PVR: --  PVRI: --, ABG - ( 19 Oct 2024 02:28 )  pH, Arterial: 7.37  pH, Blood: x     /  pCO2: 35    /  pO2: 107   / HCO3: 20    / Base Excess: -4.4  /  SaO2: 98.0                Intake and output was reviewed and the fluid balance was calculated  Daily Height in cm: 167.64 (18 Oct 2024 13:16)    Daily   I&O's Summary    18 Oct 2024 07:01  -  19 Oct 2024 07:00  --------------------------------------------------------  IN: 932.5 mL / OUT: 1990 mL / NET: -1057.5 mL    19 Oct 2024 07:01  -  19 Oct 2024 11:45  --------------------------------------------------------  IN: 90 mL / OUT: 550 mL / NET: -460 mL        All lines and drain sites were assessed  [facility]      POCT Blood Glucose.: 121 mg/dL (19 Oct 2024 07:50)      Neuro:  HEENT:  Heart:  Lungs:  Abdomen:  Extremities:    Lines:    Tubes:      labs  CBC Full  -  ( 19 Oct 2024 02:22 )  WBC Count : 13.84 K/uL  RBC Count : 4.47 M/uL  Hemoglobin : 12.1 g/dL  Hematocrit : 36.7 %  Platelet Count - Automated : 241 K/uL  Mean Cell Volume : 82.1 fl  Mean Cell Hemoglobin : 27.1 pg  Mean Cell Hemoglobin Concentration : 33.0 gm/dL  Auto Neutrophil # : 12.38 K/uL  Auto Lymphocyte # : 0.75 K/uL  Auto Monocyte # : 0.65 K/uL  Auto Eosinophil # : 0.00 K/uL  Auto Basophil # : 0.02 K/uL  Auto Neutrophil % : 89.5 %  Auto Lymphocyte % : 5.4 %  Auto Monocyte % : 4.7 %  Auto Eosinophil % : 0.0 %  Auto Basophil % : 0.1 %    10-19    132[L]  |  98  |  23  ----------------------------<  91  3.8   |  20[L]  |  0.73    Ca    8.3[L]      19 Oct 2024 02:22  Phos  4.3     10-19  Mg     2.4     10-19    TPro  7.2  /  Alb  4.3  /  TBili  0.7  /  DBili  x   /  AST  32  /  ALT  26  /  AlkPhos  99  10-19    PT/INR - ( 19 Oct 2024 02:22 )   PT: 13.4 sec;   INR: 1.15          PTT - ( 19 Oct 2024 02:22 )  PTT:38.6 sec  The current medications were reviewed   MEDICATIONS  (STANDING):  acetaminophen   IVPB .. 1000 milliGRAM(s) IV Intermittent every 6 hours  ascorbic acid 500 milliGRAM(s) Oral daily  aspirin enteric coated 81 milliGRAM(s) Oral daily  atorvastatin 80 milliGRAM(s) Oral at bedtime  ceFAZolin   IVPB 2000 milliGRAM(s) IV Intermittent every 8 hours  chlorhexidine 2% Cloths 1 Application(s) Topical daily  clopidogrel Tablet 75 milliGRAM(s) Oral daily  dextrose 5%. 1000 milliLiter(s) (100 mL/Hr) IV Continuous <Continuous>  dextrose 5%. 1000 milliLiter(s) (50 mL/Hr) IV Continuous <Continuous>  dextrose 50% Injectable 25 Gram(s) IV Push once  dextrose 50% Injectable 12.5 Gram(s) IV Push once  dextrose 50% Injectable 25 Gram(s) IV Push once  gabapentin 100 milliGRAM(s) Oral every 8 hours  glucagon  Injectable 1 milliGRAM(s) IntraMuscular once  heparin   Injectable 5000 Unit(s) SubCutaneous every 8 hours  insulin glargine Injectable (LANTUS) 10 Unit(s) SubCutaneous at bedtime  insulin lispro (ADMELOG) corrective regimen sliding scale   SubCutaneous Before meals and at bedtime  insulin lispro Injectable (ADMELOG) 5 Unit(s) SubCutaneous three times a day before meals  metoprolol tartrate 12.5 milliGRAM(s) Oral every 8 hours  mupirocin 2% Nasal 1 Application(s) Both Nostrils two times a day  pantoprazole    Tablet 40 milliGRAM(s) Oral before breakfast  polyethylene glycol 3350 17 Gram(s) Oral daily  senna 2 Tablet(s) Oral at bedtime  sodium chloride 0.9%. 1000 milliLiter(s) (10 mL/Hr) IV Continuous <Continuous>  tamsulosin 0.4 milliGRAM(s) Oral at bedtime    MEDICATIONS  (PRN):  dextrose Oral Gel 15 Gram(s) Oral once PRN Blood Glucose LESS THAN 70 milliGRAM(s)/deciliter  oxyCODONE    IR 5 milliGRAM(s) Oral every 4 hours PRN Moderate Pain (4 - 6)      Assessment/Plan:  79yr old male with PMH HTN, HLD, DM (A1C 9.1), afib on eliquis, CKD, CAD sp PCI with ENZO to OM1 2019, NSTEMI 1/17/24 sp cardiac cath with PTCA cb dissection, recent cath (10/1/24) with 80-90% stenosis admitted for surgical mgmt.     POD1 midCABG (LIMA-LAD) with JOAQUIN clip, nl EF (Patrick, 10/19/24)  Acute post operative anemia due to acute blood loss-trend H/H  Aspirin  Plavix  Statin  Periop abx  DM-endocrinology following  BBlocker-uptitrate prn  Diet as tolerated  Replete lytes prn  Monitor CT output  GI/DVT PPX  Bowel Regimen  Pain control  OOB with PT  Close hemodynamic, ventilatory and drain monitoring and management per post op routine  Monitor for arrhythmias and monitor parameters for organ perfusion  Monitor neurologic status  Head of the bed should remain elevated to 45 deg   Chest PT and IS will be encouraged  Monitor adequacy of oxygenation and ventilation and attempt to wean oxygen  Antibiotic regimen will be tailored to the clinical, laboratory and microbiologic data  Nutritional goals will be met using po eventually, ensure adequate caloric intake and monitor the same  Stress ulcer and VTE prophylaxis will be achieved    Glycemic control is satisfactory  Electrolytes have been repleted as necessary and wound care has been carried out   Pain control has been achieved.   Aggressive physical therapy and early mobility and ambulation goals will be met   The family was updated about the course and plan.    CRITICAL CARE TIME personally provided by me  in evaluation and management, reassessments, review and interpretation of labs and x-rays, ventilator and hemodynamic management, formulating a plan and coordinating care: __35__ MIN.  Time does not include procedural time.      CTICU ATTENDING     					  Linette Jerome MD

## 2024-10-19 NOTE — CONSULT NOTE ADULT - SUBJECTIVE AND OBJECTIVE BOX
HPI: This is a 79 year old man with PMH of type 2 DM (A1c 11.7%), pAF on AC, CAD s/p prior PCI, HTN, HLD admitted for NSTEMI, s/p MIDCAB 10/18/24 for whom endocrine is consulted for diabetes management.    Patient was admitted at OSH on 9/30/24 with chest pain at rest and discovered to have NSTEMI. On 10/1/24 Cardiac cath showed a patent stent in OM1, 80-90% mid-distal LAD stenosis. He was discharged home on 10/2. He saw Dr. Patrick and underwent MIDCAB on 10/18/24.     Diabetes history:  -Diagnosed at  .   -control:   -ever have DKA or HHS?  -complications   -Treatment regimen: Lantus 40u, glipiZIDE 10 mg, Invokana 300 mg, Prandin 2    -FSG checks/day:  -fasting:    -post prandial:  -before bedtime:  -ROS: any hypoglycemia recently, +hypoglycemia awareness  -Diet:  -Family h/o DM:     Is currently on an insulin gtt.       ROS:  -Constitutional: no weight change, no f/c/sweats, no fatigue. no insomnia. no heat/cold intolerance.  -Skin: No change. no hair changes. no bruisability.  -Eye:  no vision changes, no peripheral vision loss. No blurriness.  -Neck: no neck enlargement, dysphagia, odynophagia.   -CV: no cp, no palpitations, no change in exercise tolerance, no daytime somnolence  -Pulm: no SOB  -GI: no n/v/c/d/abd pain  -: no polyuria/polydipsia. no nocturia.   -MSK: no LE edema, no proximal muscle weakness, no hx fractures  -Neuro: no tremors. No dizziness, lightheadedness.   -10 point ROS otherwise negative    Past Medical History:  HTN (hypertension)    HLD (hyperlipidemia)    CAD (coronary artery disease)    DM (diabetes mellitus)    Stage 2 chronic kidney disease    Atrial fibrillation    NSTEMI (non-ST elevation myocardial infarction)        Past Surgical history:      Family History:      Allergies: No Known Allergies      Home Medications:  amLODIPine 5 mg oral tablet: 1 tab(s) orally once a day  atorvastatin 80 mg oral tablet: 1 tab(s) orally once a day (at bedtime)  carvedilol 25 mg oral tablet: 1 tab(s) orally 2 times a day  DULoxetine 60 mg oral delayed release capsule: 1 cap(s) orally once a day (at bedtime)  Eliquis 5 mg oral tablet: 1 tab(s) orally 2 times a day  furosemide 20 mg oral tablet: 1 tab(s) orally every other day  glipiZIDE 10 mg oral tablet: 1 tab(s) orally once a day  Invokana 300 mg oral tablet: 1 tab(s) orally once a day  isosorbide mononitrate 30 mg oral tablet, extended release: 1 tab(s) orally once a day  Lantus 100 units/mL subcutaneous solution: 40 unit(s) subcutaneous once a day (at bedtime)  losartan 25 mg oral tablet: 1 tab(s) orally once a day  repaglinide 2 mg oral tablet: 1 tab(s) orally 3 times a day  tamsulosin 0.4 mg oral capsule: 1 cap(s) orally once a day  Zetia 10 mg oral tablet: 1 tab(s) orally once a day    Inpatient Medications:  acetaminophen   IVPB .. 1000 milliGRAM(s) IV Intermittent every 6 hours  ascorbic acid 500 milliGRAM(s) Oral daily  aspirin enteric coated 81 milliGRAM(s) Oral daily  ceFAZolin   IVPB 2000 milliGRAM(s) IV Intermittent every 8 hours  chlorhexidine 2% Cloths 1 Application(s) Topical daily  clopidogrel Tablet 75 milliGRAM(s) Oral daily  dextrose 50% Injectable 50 milliLiter(s) IV Push every 15 minutes  dextrose 50% Injectable 25 milliLiter(s) IV Push every 15 minutes  gabapentin 100 milliGRAM(s) Oral every 8 hours  heparin   Injectable 5000 Unit(s) SubCutaneous every 8 hours  insulin regular Infusion 2 Unit(s)/Hr IV Continuous <Continuous>  metoprolol tartrate 12.5 milliGRAM(s) Oral every 8 hours  mupirocin 2% Nasal 1 Application(s) Both Nostrils two times a day  oxyCODONE    IR 5 milliGRAM(s) Oral every 4 hours PRN  pantoprazole  Injectable 40 milliGRAM(s) IV Push daily  polyethylene glycol 3350 17 Gram(s) Oral daily  senna 2 Tablet(s) Oral at bedtime  sodium chloride 0.9%. 1000 milliLiter(s) IV Continuous <Continuous>    Exam:  Vitals:  T(C): 36.4 (10-19-24 @ 05:09), Max: 36.4 (10-19-24 @ 01:01)  HR: 99 (10-19-24 @ 08:00) (65 - 104)  BP: 156/80 (10-18-24 @ 13:16) (156/80 - 156/80)  ABP: 117/59 (10-19-24 @ 08:00) (117/59 - 157/69)  RR: 19 (10-19-24 @ 08:00) (16 - 20)  SpO2: 96% (10-19-24 @ 08:00) (96% - 100%)  Height (cm): 167.6 (10-18-24 @ 13:16)  Weight (kg): 65.3 (10-18-24 @ 13:16)  BMI (kg/m2): 23.2 (10-18-24 @ 13:16)  BSA (m2): 1.74 (10-18-24 @ 13:16)    10-18-24 @ 07:01  -  10-19-24 @ 07:00  --------------------------------------------------------  IN: 932.5 mL / OUT: 1990 mL / NET: -1057.5 mL    10-19-24 @ 07:01  -  10-19-24 @ 09:11  --------------------------------------------------------  IN: 60 mL / OUT: 95 mL / NET: -35 mL      GEN: well appearing, NAD, intact mental status, A&O  SKIN: no skin hyperpigmentation  HEENT: EOMI, no proptosis, no lid lag  NECK: no thyromegaly, no nodules.   CV: RRR  LUNGS: CTAB  ABD: Soft, nontender, nondistended. Normoactive bowel sounds  NEURO: No tremors. Normoactive reflexes  EXT no edema  PSYCH: normal mood, affect    Labs:  10-19    132[L]  |  98  |  23  ----------------------------<  91  3.8   |  20[L]  |  0.73    Ca    8.3[L]      19 Oct 2024 02:22  Phos  4.3     10-19  Mg     2.4     10-19    TPro  7.2  /  Alb  4.3  /  TBili  0.7  /  DBili  x   /  AST  32  /  ALT  26  /  AlkPhos  99  10-19                        12.1   13.84 )-----------( 241      ( 19 Oct 2024 02:22 )             36.7   LIVER FUNCTIONS - ( 19 Oct 2024 02:22 )  Alb: 4.3 g/dL / Pro: 7.2 g/dL / ALK PHOS: 99 U/L / ALT: 26 U/L / AST: 32 U/L / GGT: x         CAPILLARY BLOOD GLUCOSE      POCT Blood Glucose.: 121 mg/dL (19 Oct 2024 07:50)  POCT Blood Glucose.: 82 mg/dL (18 Oct 2024 21:09)  POCT Blood Glucose.: 95 mg/dL (18 Oct 2024 10:09)  Thyroid Stimulating Hormone, Serum: 4.250 uIU/mL *H* (10-17-24 @ 18:08)    Assessment: This is a 79 year old man with PMH of type 2 DM (A1c 11.7%), pAF on AC, CAD s/p prior PCI, HTN, HLD admitted for NSTEMI, s/p MIDCAB 10/18/24 for whom endocrine is consulted for diabetes management.    Recommendations:  ===NOTE INCOMPLETE=====     HPI: This is a 79 year old man with PMH of type 2 DM (A1c 11.7%), pAF on AC, CAD s/p prior PCI, HTN, HLD admitted for NSTEMI, s/p MIDCAB 10/18/24 for whom endocrine is consulted for diabetes management.    Patient was admitted at OSH on 9/30/24 with chest pain at rest and discovered to have NSTEMI. On 10/1/24 Cardiac cath showed a patent stent in OM1, 80-90% mid-distal LAD stenosis. He was discharged home on 10/2. He saw Dr. Patrick and underwent MIDCAB on 10/18/24.     Type 2 Diabetes history:  -Diagnosed around 2014  -control: poor  -outpatient endocrinologist: Dr. Brielle Ryan on [place]  -complications: he denies retinopathy, neuropathy  -Treatment regimen: Lantus 30u qhs, glipiZIDE 10 mg, Invokana 300 mg. Repaglinide 2mg is listed on his med list but he doesn't recall if he takes it.     -FSG checks/day: once, evening. Reported range: mid 100s  -Diet at home: 3 meals a day: usually banana, 1% milk for bkfast, vegetable, brown rice for lunch. Broccoli, chicken for dinner. He says he quit drinking juice recently.  -Family h/o DM: sister with DM    Intraop was on an insulin gtt- I don't see any units recorded.      ROS:  -Constitutional: no weight change, no f/c/sweats, no fatigue. no insomnia. no heat/cold intolerance.  -Skin: No change. no hair changes. no bruisability.  -Eye:  no vision changes, no peripheral vision loss. No blurriness.  -Neck: no neck enlargement, dysphagia, odynophagia.   -CV: no cp, no palpitations, no change in exercise tolerance, no daytime somnolence  -Pulm: no SOB  -GI: no n/v/c/d/abd pain  -: no polyuria/polydipsia. no nocturia.   -MSK: no LE edema, no proximal muscle weakness, no hx fractures  -Neuro: no tremors. No dizziness, lightheadedness.   -10 point ROS otherwise negative    Past Medical History:  HTN (hypertension)    HLD (hyperlipidemia)    CAD (coronary artery disease)    DM (diabetes mellitus)    Stage 2 chronic kidney disease    Atrial fibrillation    NSTEMI (non-ST elevation myocardial infarction)        Past Surgical history:      Family History:      Allergies: No Known Allergies      Home Medications:  amLODIPine 5 mg oral tablet: 1 tab(s) orally once a day  atorvastatin 80 mg oral tablet: 1 tab(s) orally once a day (at bedtime)  carvedilol 25 mg oral tablet: 1 tab(s) orally 2 times a day  DULoxetine 60 mg oral delayed release capsule: 1 cap(s) orally once a day (at bedtime)  Eliquis 5 mg oral tablet: 1 tab(s) orally 2 times a day  furosemide 20 mg oral tablet: 1 tab(s) orally every other day  glipiZIDE 10 mg oral tablet: 1 tab(s) orally once a day  Invokana 300 mg oral tablet: 1 tab(s) orally once a day  isosorbide mononitrate 30 mg oral tablet, extended release: 1 tab(s) orally once a day  Lantus 100 units/mL subcutaneous solution: 40 unit(s) subcutaneous once a day (at bedtime)  losartan 25 mg oral tablet: 1 tab(s) orally once a day  repaglinide 2 mg oral tablet: 1 tab(s) orally 3 times a day  tamsulosin 0.4 mg oral capsule: 1 cap(s) orally once a day  Zetia 10 mg oral tablet: 1 tab(s) orally once a day    Inpatient Medications:  acetaminophen   IVPB .. 1000 milliGRAM(s) IV Intermittent every 6 hours  ascorbic acid 500 milliGRAM(s) Oral daily  aspirin enteric coated 81 milliGRAM(s) Oral daily  ceFAZolin   IVPB 2000 milliGRAM(s) IV Intermittent every 8 hours  chlorhexidine 2% Cloths 1 Application(s) Topical daily  clopidogrel Tablet 75 milliGRAM(s) Oral daily  dextrose 50% Injectable 50 milliLiter(s) IV Push every 15 minutes  dextrose 50% Injectable 25 milliLiter(s) IV Push every 15 minutes  gabapentin 100 milliGRAM(s) Oral every 8 hours  heparin   Injectable 5000 Unit(s) SubCutaneous every 8 hours  insulin regular Infusion 2 Unit(s)/Hr IV Continuous <Continuous>  metoprolol tartrate 12.5 milliGRAM(s) Oral every 8 hours  mupirocin 2% Nasal 1 Application(s) Both Nostrils two times a day  oxyCODONE    IR 5 milliGRAM(s) Oral every 4 hours PRN  pantoprazole  Injectable 40 milliGRAM(s) IV Push daily  polyethylene glycol 3350 17 Gram(s) Oral daily  senna 2 Tablet(s) Oral at bedtime  sodium chloride 0.9%. 1000 milliLiter(s) IV Continuous <Continuous>    Exam:  Vitals:  T(C): 36.4 (10-19-24 @ 05:09), Max: 36.4 (10-19-24 @ 01:01)  HR: 99 (10-19-24 @ 08:00) (65 - 104)  BP: 156/80 (10-18-24 @ 13:16) (156/80 - 156/80)  ABP: 117/59 (10-19-24 @ 08:00) (117/59 - 157/69)  RR: 19 (10-19-24 @ 08:00) (16 - 20)  SpO2: 96% (10-19-24 @ 08:00) (96% - 100%)  Height (cm): 167.6 (10-18-24 @ 13:16)  Weight (kg): 65.3 (10-18-24 @ 13:16)  BMI (kg/m2): 23.2 (10-18-24 @ 13:16)  BSA (m2): 1.74 (10-18-24 @ 13:16)    10-18-24 @ 07:01  -  10-19-24 @ 07:00  --------------------------------------------------------  IN: 932.5 mL / OUT: 1990 mL / NET: -1057.5 mL    10-19-24 @ 07:01  -  10-19-24 @ 09:11  --------------------------------------------------------  IN: 60 mL / OUT: 95 mL / NET: -35 mL      GEN: well appearing, NAD, intact mental status, A&O  SKIN: no skin hyperpigmentation  HEENT: EOMI, no proptosis, no lid lag  NECK: no thyromegaly, no nodules.   CV: RRR  LUNGS: CTAB  ABD: Soft, nontender, nondistended. Normoactive bowel sounds  NEURO: No tremors. Normoactive reflexes  EXT no edema  PSYCH: normal mood, affect    Labs:  10-19    132[L]  |  98  |  23  ----------------------------<  91  3.8   |  20[L]  |  0.73    Ca    8.3[L]      19 Oct 2024 02:22  Phos  4.3     10-19  Mg     2.4     10-19    TPro  7.2  /  Alb  4.3  /  TBili  0.7  /  DBili  x   /  AST  32  /  ALT  26  /  AlkPhos  99  10-19                        12.1   13.84 )-----------( 241      ( 19 Oct 2024 02:22 )             36.7   LIVER FUNCTIONS - ( 19 Oct 2024 02:22 )  Alb: 4.3 g/dL / Pro: 7.2 g/dL / ALK PHOS: 99 U/L / ALT: 26 U/L / AST: 32 U/L / GGT: x         CAPILLARY BLOOD GLUCOSE      POCT Blood Glucose.: 121 mg/dL (19 Oct 2024 07:50)  POCT Blood Glucose.: 82 mg/dL (18 Oct 2024 21:09)  POCT Blood Glucose.: 95 mg/dL (18 Oct 2024 10:09)  Thyroid Stimulating Hormone, Serum: 4.250 uIU/mL *H* (10-17-24 @ 18:08)    Assessment: This is a 79 year old man with PMH of type 2 DM (A1c 11.7%), pAF on AC, CAD s/p prior PCI, HTN, HLD admitted for NSTEMI, s/p MIDCAB 10/18/24 for whom endocrine is consulted for diabetes management.    Recommendations:  ===NOTE INCOMPLETE=====     HPI: This is a 79 year old man with PMH of type 2 DM (A1c 11.7%), pAF on AC, CAD s/p prior PCI, HTN, HLD admitted for NSTEMI, s/p MIDCAB 10/18/24 for whom endocrine is consulted for diabetes management.    Patient was admitted at OSH on 9/30/24 with chest pain at rest and discovered to have NSTEMI. On 10/1/24 Cardiac cath showed a patent stent in OM1, 80-90% mid-distal LAD stenosis. He was discharged home on 10/2. He saw Dr. Patrick and underwent MIDCAB on 10/18/24.     Type 2 Diabetes history:  -Diagnosed around 2014  -control: poor  -outpatient endocrinologist: Dr. Brielle Ryan on [facility] [place]  -complications: he denies retinopathy, neuropathy  -Treatment regimen: Lantus 30u qhs, glipiZIDE 10 mg, Invokana 300 mg. Repaglinide 2mg is listed on his med list but he doesn't recall if he takes it.     -FSG checks/day: once, evening. Reported range: mid 100s  -Diet at home: 3 meals a day: usually banana, 1% milk for bkfast, vegetable, brown rice for lunch. Broccoli, chicken for dinner. He says he quit drinking juice recently.  -Family h/o DM: sister with DM    Intraop was on an insulin gtt- I don't see any units recorded. This morning he reports that he is very hungry. Ate pancakes, scrambled eggs.      ROS:  -Constitutional: no weight change, no f/c/sweats, no fatigue. no insomnia. no heat/cold intolerance.  -Skin: No change. no hair changes. no bruisability.  -Eye:  recently got new glasses  -Neck: no neck enlargement, dysphagia, odynophagia.   -CV: has chest pain  -Pulm: no SOB  -GI: no n/v/c/d/abd pain  -: no polyuria/polydipsia. no nocturia.   -MSK: no LE edema,   -Neuro: no tremors. No dizziness, lightheadedness.   -10 point ROS otherwise negative    Past Medical History:  HTN (hypertension)    HLD (hyperlipidemia)    CAD (coronary artery disease)    DM (diabetes mellitus)    Stage 2 chronic kidney disease    Atrial fibrillation    NSTEMI (non-ST elevation myocardial infarction)        Past Surgical history:      Family History:      Allergies: No Known Allergies      Home Medications:  amLODIPine 5 mg oral tablet: 1 tab(s) orally once a day  atorvastatin 80 mg oral tablet: 1 tab(s) orally once a day (at bedtime)  carvedilol 25 mg oral tablet: 1 tab(s) orally 2 times a day  DULoxetine 60 mg oral delayed release capsule: 1 cap(s) orally once a day (at bedtime)  Eliquis 5 mg oral tablet: 1 tab(s) orally 2 times a day  furosemide 20 mg oral tablet: 1 tab(s) orally every other day  glipiZIDE 10 mg oral tablet: 1 tab(s) orally once a day  Invokana 300 mg oral tablet: 1 tab(s) orally once a day  isosorbide mononitrate 30 mg oral tablet, extended release: 1 tab(s) orally once a day  Lantus 100 units/mL subcutaneous solution: 30 unit(s) subcutaneous once a day (at bedtime)  losartan 25 mg oral tablet: 1 tab(s) orally once a day  tamsulosin 0.4 mg oral capsule: 1 cap(s) orally once a day  Zetia 10 mg oral tablet: 1 tab(s) orally once a day    Inpatient Medications:  acetaminophen   IVPB .. 1000 milliGRAM(s) IV Intermittent every 6 hours  ascorbic acid 500 milliGRAM(s) Oral daily  aspirin enteric coated 81 milliGRAM(s) Oral daily  ceFAZolin   IVPB 2000 milliGRAM(s) IV Intermittent every 8 hours  chlorhexidine 2% Cloths 1 Application(s) Topical daily  clopidogrel Tablet 75 milliGRAM(s) Oral daily  dextrose 50% Injectable 50 milliLiter(s) IV Push every 15 minutes  dextrose 50% Injectable 25 milliLiter(s) IV Push every 15 minutes  gabapentin 100 milliGRAM(s) Oral every 8 hours  heparin   Injectable 5000 Unit(s) SubCutaneous every 8 hours  insulin regular Infusion 2 Unit(s)/Hr IV Continuous <Continuous>  metoprolol tartrate 12.5 milliGRAM(s) Oral every 8 hours  mupirocin 2% Nasal 1 Application(s) Both Nostrils two times a day  oxyCODONE    IR 5 milliGRAM(s) Oral every 4 hours PRN  pantoprazole  Injectable 40 milliGRAM(s) IV Push daily  polyethylene glycol 3350 17 Gram(s) Oral daily  senna 2 Tablet(s) Oral at bedtime  sodium chloride 0.9%. 1000 milliLiter(s) IV Continuous <Continuous>    Exam:  Vitals:  T(C): 36.4 (10-19-24 @ 05:09), Max: 36.4 (10-19-24 @ 01:01)  HR: 99 (10-19-24 @ 08:00) (65 - 104)  BP: 156/80 (10-18-24 @ 13:16) (156/80 - 156/80)  ABP: 117/59 (10-19-24 @ 08:00) (117/59 - 157/69)  RR: 19 (10-19-24 @ 08:00) (16 - 20)  SpO2: 96% (10-19-24 @ 08:00) (96% - 100%)  Height (cm): 167.6 (10-18-24 @ 13:16)  Weight (kg): 65.3 (10-18-24 @ 13:16)  BMI (kg/m2): 23.2 (10-18-24 @ 13:16)  BSA (m2): 1.74 (10-18-24 @ 13:16)    10-18-24 @ 07:01  -  10-19-24 @ 07:00  --------------------------------------------------------  IN: 932.5 mL / OUT: 1990 mL / NET: -1057.5 mL    10-19-24 @ 07:01  -  10-19-24 @ 09:11  --------------------------------------------------------  IN: 60 mL / OUT: 95 mL / NET: -35 mL      GEN: well appearing, NAD, intact mental status  SKIN: no skin hyperpigmentation  HEENT: EOMI, no proptosis, no lid lag. Central line in right side of neck  CV: RRR  CHEST: tube draining serosanguineous fluid  ABD: Soft, nontender, slightly distended. Hypoactive bowel sounds  NEURO: No tremors.   EXT no edema  PSYCH: normal mood, affect    Labs:  10-19    132[L]  |  98  |  23  ----------------------------<  91  3.8   |  20[L]  |  0.73    Ca    8.3[L]      19 Oct 2024 02:22  Phos  4.3     10-19  Mg     2.4     10-19    TPro  7.2  /  Alb  4.3  /  TBili  0.7  /  DBili  x   /  AST  32  /  ALT  26  /  AlkPhos  99  10-19                        12.1   13.84 )-----------( 241      ( 19 Oct 2024 02:22 )             36.7   LIVER FUNCTIONS - ( 19 Oct 2024 02:22 )  Alb: 4.3 g/dL / Pro: 7.2 g/dL / ALK PHOS: 99 U/L / ALT: 26 U/L / AST: 32 U/L / GGT: x         CAPILLARY BLOOD GLUCOSE      POCT Blood Glucose.: 121 mg/dL (19 Oct 2024 07:50)  POCT Blood Glucose.: 82 mg/dL (18 Oct 2024 21:09)  POCT Blood Glucose.: 95 mg/dL (18 Oct 2024 10:09)  Thyroid Stimulating Hormone, Serum: 4.250 uIU/mL *H* (10-17-24 @ 18:08)    Assessment: This is a 79 year old man with PMH of type 2 DM (A1c 11.7%, was on Lantus 30u at home, invokana, glipizide), pAF on AC, CAD s/p prior PCI, HTN, HLD admitted for NSTEMI, s/p MIDCAB 10/18/24 for whom endocrine is consulted for diabetes management. Insulin requirements were very low when he was NPO.     Recommendations:  -Agree with Lantus 10u this evening. Will likely need to be uptitrated in the next few days  -Continue Lispro 5u before meals, moderate dose sliding scale.  -FSGs qAc, qHs.     Huma Rodriguez MD  Endocrinology Attending   HPI: This is a 79 year old man with PMH of type 2 DM (A1c 11.7%), pAF on AC, CAD s/p prior PCI, HTN, HLD admitted for NSTEMI, s/p MIDCAB 10/18/24 for whom endocrine is consulted for diabetes management.    Patient was admitted at OSH on 9/30/24 with chest pain at rest and discovered to have NSTEMI. On 10/1/24 Cardiac cath showed a patent stent in OM1, 80-90% mid-distal LAD stenosis. He was discharged home on 10/2. He saw Dr. Patrick and underwent MIDCAB on 10/18/24.     Type 2 Diabetes history:  -Diagnosed around 2014  -control: poor  -outpatient endocrinologist: Dr. Brielle Ryan on [facility] [place]  -complications: he denies retinopathy, neuropathy  -Treatment regimen: Lantus 30u qhs, glipiZIDE 10 mg, Invokana 300 mg. Repaglinide 2mg is listed on his med list but he doesn't recall if he takes it.     -FSG checks/day: once, evening. Reported range: mid 100s  -Diet at home: 3 meals a day: usually banana, 1% milk for bkfast, vegetable, brown rice for lunch. Broccoli, chicken for dinner. He says he quit drinking juice recently.  -Family h/o DM: sister with DM    Intraop was on an insulin gtt- I don't see any units recorded. This morning he reports that he is very hungry. Ate pancakes, scrambled eggs.      ROS:  -Constitutional: no weight change, no f/c/sweats, no fatigue. no insomnia. no heat/cold intolerance.  -Skin: No change. no hair changes. no bruisability.  -Eye:  recently got new glasses  -Neck: no neck enlargement, dysphagia, odynophagia.   -CV: has chest pain  -Pulm: no SOB  -GI: no n/v/c/d/abd pain  -: no polyuria/polydipsia. no nocturia.   -MSK: no LE edema,   -Neuro: no tremors. No dizziness, lightheadedness.   -10 point ROS otherwise negative    Past Medical History:  HTN (hypertension)    HLD (hyperlipidemia)    CAD (coronary artery disease)    DM (diabetes mellitus)    Stage 2 chronic kidney disease    Atrial fibrillation    NSTEMI (non-ST elevation myocardial infarction)        Past Surgical history:      Family History:      Allergies: No Known Allergies      Home Medications:  amLODIPine 5 mg oral tablet: 1 tab(s) orally once a day  atorvastatin 80 mg oral tablet: 1 tab(s) orally once a day (at bedtime)  carvedilol 25 mg oral tablet: 1 tab(s) orally 2 times a day  DULoxetine 60 mg oral delayed release capsule: 1 cap(s) orally once a day (at bedtime)  Eliquis 5 mg oral tablet: 1 tab(s) orally 2 times a day  furosemide 20 mg oral tablet: 1 tab(s) orally every other day  glipiZIDE 10 mg oral tablet: 1 tab(s) orally once a day  Invokana 300 mg oral tablet: 1 tab(s) orally once a day  isosorbide mononitrate 30 mg oral tablet, extended release: 1 tab(s) orally once a day  Lantus 100 units/mL subcutaneous solution: 30 unit(s) subcutaneous once a day (at bedtime)  losartan 25 mg oral tablet: 1 tab(s) orally once a day  tamsulosin 0.4 mg oral capsule: 1 cap(s) orally once a day  Zetia 10 mg oral tablet: 1 tab(s) orally once a day    Inpatient Medications:  acetaminophen   IVPB .. 1000 milliGRAM(s) IV Intermittent every 6 hours  ascorbic acid 500 milliGRAM(s) Oral daily  aspirin enteric coated 81 milliGRAM(s) Oral daily  ceFAZolin   IVPB 2000 milliGRAM(s) IV Intermittent every 8 hours  chlorhexidine 2% Cloths 1 Application(s) Topical daily  clopidogrel Tablet 75 milliGRAM(s) Oral daily  dextrose 50% Injectable 50 milliLiter(s) IV Push every 15 minutes  dextrose 50% Injectable 25 milliLiter(s) IV Push every 15 minutes  gabapentin 100 milliGRAM(s) Oral every 8 hours  heparin   Injectable 5000 Unit(s) SubCutaneous every 8 hours  insulin regular Infusion 2 Unit(s)/Hr IV Continuous <Continuous>  metoprolol tartrate 12.5 milliGRAM(s) Oral every 8 hours  mupirocin 2% Nasal 1 Application(s) Both Nostrils two times a day  oxyCODONE    IR 5 milliGRAM(s) Oral every 4 hours PRN  pantoprazole  Injectable 40 milliGRAM(s) IV Push daily  polyethylene glycol 3350 17 Gram(s) Oral daily  senna 2 Tablet(s) Oral at bedtime  sodium chloride 0.9%. 1000 milliLiter(s) IV Continuous <Continuous>    Exam:  Vitals:  T(C): 36.4 (10-19-24 @ 05:09), Max: 36.4 (10-19-24 @ 01:01)  HR: 99 (10-19-24 @ 08:00) (65 - 104)  BP: 156/80 (10-18-24 @ 13:16) (156/80 - 156/80)  ABP: 117/59 (10-19-24 @ 08:00) (117/59 - 157/69)  RR: 19 (10-19-24 @ 08:00) (16 - 20)  SpO2: 96% (10-19-24 @ 08:00) (96% - 100%)  Height (cm): 167.6 (10-18-24 @ 13:16)  Weight (kg): 65.3 (10-18-24 @ 13:16)  BMI (kg/m2): 23.2 (10-18-24 @ 13:16)  BSA (m2): 1.74 (10-18-24 @ 13:16)    10-18-24 @ 07:01  -  10-19-24 @ 07:00  --------------------------------------------------------  IN: 932.5 mL / OUT: 1990 mL / NET: -1057.5 mL    10-19-24 @ 07:01  -  10-19-24 @ 09:11  --------------------------------------------------------  IN: 60 mL / OUT: 95 mL / NET: -35 mL      GEN: well appearing, NAD, intact mental status  SKIN: no skin hyperpigmentation  HEENT: EOMI, no proptosis, no lid lag. Central line in right side of neck  CV: RRR  CHEST: tube draining serosanguineous fluid  ABD: Soft, nontender, slightly distended. Hypoactive bowel sounds  NEURO: No tremors.   EXT no edema  PSYCH: normal mood, affect    Labs:  10-19    132[L]  |  98  |  23  ----------------------------<  91  3.8   |  20[L]  |  0.73    Ca    8.3[L]      19 Oct 2024 02:22  Phos  4.3     10-19  Mg     2.4     10-19    TPro  7.2  /  Alb  4.3  /  TBili  0.7  /  DBili  x   /  AST  32  /  ALT  26  /  AlkPhos  99  10-19                        12.1   13.84 )-----------( 241      ( 19 Oct 2024 02:22 )             36.7   LIVER FUNCTIONS - ( 19 Oct 2024 02:22 )  Alb: 4.3 g/dL / Pro: 7.2 g/dL / ALK PHOS: 99 U/L / ALT: 26 U/L / AST: 32 U/L / GGT: x         CAPILLARY BLOOD GLUCOSE      POCT Blood Glucose.: 121 mg/dL (19 Oct 2024 07:50)  POCT Blood Glucose.: 82 mg/dL (18 Oct 2024 21:09)  POCT Blood Glucose.: 95 mg/dL (18 Oct 2024 10:09)  Thyroid Stimulating Hormone, Serum: 4.250 uIU/mL *H* (10-17-24 @ 18:08)    Assessment: This is a 79 year old man with PMH of type 2 DM (A1c 11.7%, was on Lantus 30u at home, invokana, glipizide), pAF on AC, CAD s/p prior PCI, HTN, HLD admitted for NSTEMI, s/p MIDCAB 10/18/24 for whom endocrine is consulted for diabetes management. Insulin requirements were very low when he was NPO. In addition, TSH 4.25 noted - this is appropriate for age.     Recommendations:  -Agree with Lantus 10u this evening. Will likely need to be uptitrated in the next few days  -Continue Lispro 5u before meals, moderate dose sliding scale.  -FSGs qAc, qHs.     Huma Rodriguez MD  Endocrinology Attending

## 2024-10-20 LAB
ANION GAP SERPL CALC-SCNC: 10 MMOL/L — SIGNIFICANT CHANGE UP (ref 5–17)
BUN SERPL-MCNC: 31 MG/DL — HIGH (ref 7–23)
CALCIUM SERPL-MCNC: 8.2 MG/DL — LOW (ref 8.4–10.5)
CHLORIDE SERPL-SCNC: 105 MMOL/L — SIGNIFICANT CHANGE UP (ref 96–108)
CO2 SERPL-SCNC: 24 MMOL/L — SIGNIFICANT CHANGE UP (ref 22–31)
CREAT SERPL-MCNC: 0.91 MG/DL — SIGNIFICANT CHANGE UP (ref 0.5–1.3)
CULTURE RESULTS: ABNORMAL
EGFR: 87 ML/MIN/1.73M2 — SIGNIFICANT CHANGE UP
GLUCOSE BLDC GLUCOMTR-MCNC: 175 MG/DL — HIGH (ref 70–99)
GLUCOSE BLDC GLUCOMTR-MCNC: 180 MG/DL — HIGH (ref 70–99)
GLUCOSE BLDC GLUCOMTR-MCNC: 237 MG/DL — HIGH (ref 70–99)
GLUCOSE BLDC GLUCOMTR-MCNC: 246 MG/DL — HIGH (ref 70–99)
GLUCOSE BLDC GLUCOMTR-MCNC: 294 MG/DL — HIGH (ref 70–99)
GLUCOSE SERPL-MCNC: 159 MG/DL — HIGH (ref 70–99)
HCT VFR BLD CALC: 33 % — LOW (ref 39–50)
HGB BLD-MCNC: 11.2 G/DL — LOW (ref 13–17)
MAGNESIUM SERPL-MCNC: 2.4 MG/DL — SIGNIFICANT CHANGE UP (ref 1.6–2.6)
MCHC RBC-ENTMCNC: 28.9 PG — SIGNIFICANT CHANGE UP (ref 27–34)
MCHC RBC-ENTMCNC: 33.9 GM/DL — SIGNIFICANT CHANGE UP (ref 32–36)
MCV RBC AUTO: 85.3 FL — SIGNIFICANT CHANGE UP (ref 80–100)
NRBC # BLD: 0 /100 WBCS — SIGNIFICANT CHANGE UP (ref 0–0)
PHOSPHATE SERPL-MCNC: 1.9 MG/DL — LOW (ref 2.5–4.5)
PLATELET # BLD AUTO: 247 K/UL — SIGNIFICANT CHANGE UP (ref 150–400)
POTASSIUM SERPL-MCNC: 4.4 MMOL/L — SIGNIFICANT CHANGE UP (ref 3.5–5.3)
POTASSIUM SERPL-SCNC: 4.4 MMOL/L — SIGNIFICANT CHANGE UP (ref 3.5–5.3)
RBC # BLD: 3.87 M/UL — LOW (ref 4.2–5.8)
RBC # FLD: 13.3 % — SIGNIFICANT CHANGE UP (ref 10.3–14.5)
SODIUM SERPL-SCNC: 139 MMOL/L — SIGNIFICANT CHANGE UP (ref 135–145)
SPECIMEN SOURCE: SIGNIFICANT CHANGE UP
WBC # BLD: 11.42 K/UL — HIGH (ref 3.8–10.5)
WBC # FLD AUTO: 11.42 K/UL — HIGH (ref 3.8–10.5)

## 2024-10-20 PROCEDURE — 99233 SBSQ HOSP IP/OBS HIGH 50: CPT

## 2024-10-20 PROCEDURE — 71045 X-RAY EXAM CHEST 1 VIEW: CPT | Mod: 26

## 2024-10-20 PROCEDURE — 71045 X-RAY EXAM CHEST 1 VIEW: CPT | Mod: 26,77

## 2024-10-20 RX ORDER — INSULIN LISPRO 100/ML
8 VIAL (ML) SUBCUTANEOUS
Refills: 0 | Status: DISCONTINUED | OUTPATIENT
Start: 2024-10-20 | End: 2024-10-21

## 2024-10-20 RX ORDER — INSULIN GLARGINE,HUM.REC.ANLOG 100/ML
14 VIAL (ML) SUBCUTANEOUS AT BEDTIME
Refills: 0 | Status: DISCONTINUED | OUTPATIENT
Start: 2024-10-20 | End: 2024-10-21

## 2024-10-20 RX ADMIN — POLYETHYLENE GLYCOL 3350 17 GRAM(S): 17 POWDER, FOR SOLUTION ORAL at 11:14

## 2024-10-20 RX ADMIN — MUPIROCIN 1 APPLICATION(S): 20 OINTMENT TOPICAL at 17:31

## 2024-10-20 RX ADMIN — Medication 2 TABLET(S): at 22:36

## 2024-10-20 RX ADMIN — Medication 25 MILLIGRAM(S): at 12:20

## 2024-10-20 RX ADMIN — OXYCODONE HYDROCHLORIDE 5 MILLIGRAM(S): 30 TABLET ORAL at 17:31

## 2024-10-20 RX ADMIN — PANTOPRAZOLE SODIUM 40 MILLIGRAM(S): 40 TABLET, DELAYED RELEASE ORAL at 05:52

## 2024-10-20 RX ADMIN — SODIUM CHLORIDE 3 MILLILITER(S): 9 INJECTION, SOLUTION INTRAMUSCULAR; INTRAVENOUS; SUBCUTANEOUS at 05:38

## 2024-10-20 RX ADMIN — Medication 8 UNIT(S): at 16:39

## 2024-10-20 RX ADMIN — GABAPENTIN 100 MILLIGRAM(S): 300 CAPSULE ORAL at 13:11

## 2024-10-20 RX ADMIN — OXYCODONE HYDROCHLORIDE 5 MILLIGRAM(S): 30 TABLET ORAL at 13:11

## 2024-10-20 RX ADMIN — Medication 2: at 07:50

## 2024-10-20 RX ADMIN — Medication 5 UNIT(S): at 07:50

## 2024-10-20 RX ADMIN — HEPARIN SODIUM 5000 UNIT(S): 10000 INJECTION INTRAVENOUS; SUBCUTANEOUS at 13:11

## 2024-10-20 RX ADMIN — Medication 0.6 MILLIGRAM(S): at 12:19

## 2024-10-20 RX ADMIN — Medication 25 MILLIGRAM(S): at 04:33

## 2024-10-20 RX ADMIN — OXYCODONE HYDROCHLORIDE 5 MILLIGRAM(S): 30 TABLET ORAL at 05:51

## 2024-10-20 RX ADMIN — SODIUM CHLORIDE 3 MILLILITER(S): 9 INJECTION, SOLUTION INTRAMUSCULAR; INTRAVENOUS; SUBCUTANEOUS at 19:22

## 2024-10-20 RX ADMIN — OXYCODONE HYDROCHLORIDE 5 MILLIGRAM(S): 30 TABLET ORAL at 13:59

## 2024-10-20 RX ADMIN — Medication 100 MILLIGRAM(S): at 00:52

## 2024-10-20 RX ADMIN — CHLORHEXIDINE GLUCONATE 1 APPLICATION(S): 40 SOLUTION TOPICAL at 05:37

## 2024-10-20 RX ADMIN — OXYCODONE HYDROCHLORIDE 5 MILLIGRAM(S): 30 TABLET ORAL at 19:22

## 2024-10-20 RX ADMIN — Medication 4: at 22:35

## 2024-10-20 RX ADMIN — Medication 14 UNIT(S): at 22:36

## 2024-10-20 RX ADMIN — CLOPIDOGREL 75 MILLIGRAM(S): 75 TABLET ORAL at 11:14

## 2024-10-20 RX ADMIN — Medication 6: at 16:39

## 2024-10-20 RX ADMIN — Medication 80 MILLIGRAM(S): at 22:36

## 2024-10-20 RX ADMIN — Medication 0.4 MILLIGRAM(S): at 22:36

## 2024-10-20 RX ADMIN — GABAPENTIN 100 MILLIGRAM(S): 300 CAPSULE ORAL at 05:51

## 2024-10-20 RX ADMIN — SODIUM CHLORIDE 3 MILLILITER(S): 9 INJECTION, SOLUTION INTRAMUSCULAR; INTRAVENOUS; SUBCUTANEOUS at 22:26

## 2024-10-20 RX ADMIN — HEPARIN SODIUM 5000 UNIT(S): 10000 INJECTION INTRAVENOUS; SUBCUTANEOUS at 05:51

## 2024-10-20 RX ADMIN — Medication 5 UNIT(S): at 11:14

## 2024-10-20 RX ADMIN — HEPARIN SODIUM 5000 UNIT(S): 10000 INJECTION INTRAVENOUS; SUBCUTANEOUS at 22:36

## 2024-10-20 RX ADMIN — GABAPENTIN 100 MILLIGRAM(S): 300 CAPSULE ORAL at 22:35

## 2024-10-20 RX ADMIN — Medication 25 MILLIGRAM(S): at 22:37

## 2024-10-20 RX ADMIN — Medication 8 UNIT(S): at 11:58

## 2024-10-20 RX ADMIN — MUPIROCIN 1 APPLICATION(S): 20 OINTMENT TOPICAL at 05:51

## 2024-10-20 RX ADMIN — Medication 4: at 11:19

## 2024-10-20 RX ADMIN — OXYCODONE HYDROCHLORIDE 5 MILLIGRAM(S): 30 TABLET ORAL at 06:30

## 2024-10-20 RX ADMIN — Medication 81 MILLIGRAM(S): at 11:14

## 2024-10-20 RX ADMIN — Medication 500 MILLIGRAM(S): at 11:13

## 2024-10-20 NOTE — PROGRESS NOTE ADULT - SUBJECTIVE AND OBJECTIVE BOX
Patient discussed on morning rounds with Dr. Osullivan    OPERATION & DATE: 10/18 midcab, JOAQUIN clip    SUBJECTIVE ASSESSMENT: resting comfortably in bed in NAD, mild incisional pain.     VITAL SIGNS:  Vital Signs Last 24 Hrs  T(C): 36.7 (20 Oct 2024 09:35), Max: 37.2 (20 Oct 2024 05:00)  T(F): 98.1 (20 Oct 2024 09:35), Max: 99 (20 Oct 2024 05:00)  HR: 88 (20 Oct 2024 09:00) (88 - 105)  BP: 129/77 (20 Oct 2024 09:00) (117/69 - 143/81)  BP(mean): 97 (20 Oct 2024 09:00) (88 - 104)  RR: 18 (20 Oct 2024 09:00) (16 - 20)  SpO2: 95% (20 Oct 2024 09:00) (94% - 98%)    Parameters below as of 20 Oct 2024 09:00  Patient On (Oxygen Delivery Method): room air      I&O's Detail    19 Oct 2024 07:01  -  20 Oct 2024 07:00  --------------------------------------------------------  IN:    IV PiggyBack: 50 mL    Oral Fluid: 480 mL    sodium chloride 0.9%: 70 mL  Total IN: 600 mL    OUT:    Bulb (mL): 50 mL    Chest Tube (mL): 285 mL    Indwelling Catheter - Urethral (mL): 1095 mL    Voided (mL): 950 mL  Total OUT: 2380 mL    Total NET: -1780 mL        CHEST TUBE:  1 pleural tube in place  GERARDO DRAIN:  chest wall gerardo in place  EPICARDIAL WIRES: none  STITCHES: none  STAPLES: none  CALHOUN: none  CENTRAL LINE: RIJ cvc in place  MIDLINE/PICC: none  WOUND VAC: none    PHYSICAL EXAM:  General: resting comfortably in bed in NAD  Neurological: AOx3. Motor skills grossly intact  Cardiovascular: Normal S1/S2. Regular rate/rhythm. No murmurs  Respiratory: Lungs CTA bilaterally. No wheezing or rales  Gastrointestinal: +BS in all 4 quadrants. Non-distended. Soft. Non-tender  Extremities: Strength 5/5 b/l upper/lower extremities. Sensation grossly intact upper/lower extremities. No edema. No calf tenderness.  Vascular: Radial 2+bilaterally, DP 2+ b/l  Incision Sites: thoracotomy incision clean, dry, intact.     LABS:                        11.2   11.42 )-----------( 247      ( 20 Oct 2024 05:30 )             33.0     PT/INR - ( 19 Oct 2024 11:46 )   PT: 15.2 sec;   INR: 1.33          PTT - ( 19 Oct 2024 11:46 )  PTT:33.1 sec  10-20    139  |  105  |  31[H]  ----------------------------<  159[H]  4.4   |  24  |  0.91    Ca    8.2[L]      20 Oct 2024 05:30  Phos  1.9     10-20  Mg     2.4     10-20    TPro  6.8  /  Alb  3.7  /  TBili  0.7  /  DBili  x   /  AST  26  /  ALT  20  /  AlkPhos  92  10-19    Urinalysis Basic - ( 20 Oct 2024 05:30 )    Color: x / Appearance: x / SG: x / pH: x  Gluc: 159 mg/dL / Ketone: x  / Bili: x / Urobili: x   Blood: x / Protein: x / Nitrite: x   Leuk Esterase: x / RBC: x / WBC x   Sq Epi: x / Non Sq Epi: x / Bacteria: x      MEDICATIONS  (STANDING):  ascorbic acid 500 milliGRAM(s) Oral daily  aspirin enteric coated 81 milliGRAM(s) Oral daily  atorvastatin 80 milliGRAM(s) Oral at bedtime  bisacodyl Suppository 10 milliGRAM(s) Rectal once  chlorhexidine 2% Cloths 1 Application(s) Topical daily  colchicine 0.6 milliGRAM(s) Oral daily  dextrose 5%. 1000 milliLiter(s) (50 mL/Hr) IV Continuous <Continuous>  dextrose 5%. 1000 milliLiter(s) (100 mL/Hr) IV Continuous <Continuous>  dextrose 50% Injectable 25 Gram(s) IV Push once  dextrose 50% Injectable 25 Gram(s) IV Push once  dextrose 50% Injectable 12.5 Gram(s) IV Push once  gabapentin 100 milliGRAM(s) Oral every 8 hours  glucagon  Injectable 1 milliGRAM(s) IntraMuscular once  heparin   Injectable 5000 Unit(s) SubCutaneous every 8 hours  insulin glargine Injectable (LANTUS) 14 Unit(s) SubCutaneous at bedtime  insulin lispro (ADMELOG) corrective regimen sliding scale   SubCutaneous Before meals and at bedtime  insulin lispro Injectable (ADMELOG) 8 Unit(s) SubCutaneous three times a day before meals  metoprolol tartrate 25 milliGRAM(s) Oral every 8 hours  mupirocin 2% Nasal 1 Application(s) Both Nostrils two times a day  pantoprazole    Tablet 40 milliGRAM(s) Oral before breakfast  polyethylene glycol 3350 17 Gram(s) Oral daily  senna 2 Tablet(s) Oral at bedtime  sodium chloride 0.9% lock flush 3 milliLiter(s) IV Push every 8 hours  sodium chloride 0.9%. 1000 milliLiter(s) (10 mL/Hr) IV Continuous <Continuous>  tamsulosin 0.4 milliGRAM(s) Oral at bedtime    MEDICATIONS  (PRN):  dextrose Oral Gel 15 Gram(s) Oral once PRN Blood Glucose LESS THAN 70 milliGRAM(s)/deciliter  oxyCODONE    IR 5 milliGRAM(s) Oral every 4 hours PRN Moderate Pain (4 - 6)    RADIOLOGY & ADDITIONAL TESTS:

## 2024-10-20 NOTE — PROGRESS NOTE ADULT - SUBJECTIVE AND OBJECTIVE BOX
Overnight events/Subjective: patient feels well. Has only been eating food supplied by the hospital. Had pancake this morning. Was eating fruit for lunch when I saw him. Reported that he didn't want the burrito or other items on his menu.    Review of constitutional, eye, neck, skin, cardiovascular, pulmonary, gastrointestinal, genitourinary, musculoskeletal, neurological and psychiatric systems is otherwise negative.     Inpatient Medications:  ascorbic acid 500 milliGRAM(s) Oral daily  aspirin enteric coated 81 milliGRAM(s) Oral daily  atorvastatin 80 milliGRAM(s) Oral at bedtime  bisacodyl Suppository 10 milliGRAM(s) Rectal once  chlorhexidine 2% Cloths 1 Application(s) Topical daily  colchicine 0.6 milliGRAM(s) Oral daily  dextrose 5%. 1000 milliLiter(s) IV Continuous <Continuous>  dextrose 5%. 1000 milliLiter(s) IV Continuous <Continuous>  dextrose 50% Injectable 25 Gram(s) IV Push once  dextrose 50% Injectable 25 Gram(s) IV Push once  dextrose 50% Injectable 12.5 Gram(s) IV Push once  dextrose Oral Gel 15 Gram(s) Oral once PRN  gabapentin 100 milliGRAM(s) Oral every 8 hours  glucagon  Injectable 1 milliGRAM(s) IntraMuscular once  heparin   Injectable 5000 Unit(s) SubCutaneous every 8 hours  insulin glargine Injectable (LANTUS) 10 Unit(s) SubCutaneous at bedtime  insulin lispro (ADMELOG) corrective regimen sliding scale   SubCutaneous Before meals and at bedtime  insulin lispro Injectable (ADMELOG) 8 Unit(s) SubCutaneous three times a day before meals  metoprolol tartrate 25 milliGRAM(s) Oral every 8 hours  mupirocin 2% Nasal 1 Application(s) Both Nostrils two times a day  oxyCODONE    IR 5 milliGRAM(s) Oral every 4 hours PRN  pantoprazole    Tablet 40 milliGRAM(s) Oral before breakfast  polyethylene glycol 3350 17 Gram(s) Oral daily  senna 2 Tablet(s) Oral at bedtime  sodium chloride 0.9% lock flush 3 milliLiter(s) IV Push every 8 hours  sodium chloride 0.9%. 1000 milliLiter(s) IV Continuous <Continuous>  tamsulosin 0.4 milliGRAM(s) Oral at bedtime      Exam:  Vitals:  T(C): 36.7 (10-20-24 @ 09:35), Max: 37.2 (10-20-24 @ 05:00)  HR: 88 (10-20-24 @ 09:00) (88 - 105)  BP: 129/77 (10-20-24 @ 09:00) (117/69 - 143/81)  ABP: 143/63 (10-19-24 @ 16:00) (140/63 - 152/63)  RR: 18 (10-20-24 @ 09:00) (16 - 20)  SpO2: 95% (10-20-24 @ 09:00) (94% - 98%)      10-19-24 @ 07:01  -  10-20-24 @ 07:00  --------------------------------------------------------  IN: 600 mL / OUT: 2380 mL / NET: -1780 mL      GEN: well appearing, sitting in bed  SKIN: no skin hyperpigmentation  HEENT: EOMI  NEURO: No tremors.   EXT no edema  PSYCH: normal mood, affect    Labs:  10-20    139  |  105  |  31[H]  ----------------------------<  159[H]  4.4   |  24  |  0.91    Ca    8.2[L]      20 Oct 2024 05:30  Phos  1.9     10-20  Mg     2.4     10-20    TPro  6.8  /  Alb  3.7  /  TBili  0.7  /  DBili  x   /  AST  26  /  ALT  20  /  AlkPhos  92  10-19                        11.2   11.42 )-----------( 247      ( 20 Oct 2024 05:30 )             33.0   LIVER FUNCTIONS - ( 19 Oct 2024 11:46 )  Alb: 3.7 g/dL / Pro: 6.8 g/dL / ALK PHOS: 92 U/L / ALT: 20 U/L / AST: 26 U/L / GGT: x         CAPILLARY BLOOD GLUCOSE      POCT Blood Glucose.: 237 mg/dL (20 Oct 2024 11:14)  POCT Blood Glucose.: 180 mg/dL (20 Oct 2024 07:41)  POCT Blood Glucose.: 175 mg/dL (20 Oct 2024 06:35)  POCT Blood Glucose.: 225 mg/dL (19 Oct 2024 21:48)  POCT Blood Glucose.: 295 mg/dL (19 Oct 2024 17:06)  Thyroid Stimulating Hormone, Serum: 4.250 uIU/mL *H* (10-17-24 @ 18:08)    This is a 79 year old man with PMH of type 2 DM (A1c 11.7%, was on Lantus 30u at home, invokana, glipizide), pAF on AC, CAD s/p prior PCI, HTN, HLD admitted for NSTEMI, s/p MIDCAB 10/18/24 for whom endocrine is consulted for diabetes management. Insulin requirements were very low when he was NPO but have increased significantly after he's been having full meals. In addition, TSH 4.25 noted - this is appropriate for age.     Recommendations:  -Increase Lantus to 14u this evening.   -Increase Lispro to 8u before meals. Continue Lispro moderate dose sliding scale.  -FSGs qAc, qHs.     Huma Rodriguez MD  Endocrinology Attending

## 2024-10-20 NOTE — PROGRESS NOTE ADULT - ASSESSMENT
78 YO [place]ian-speaking Male with PMHx of HTN, HLD, DMII (A1C 11.7%), pAF (on Eliquis), HFpEF, CKD-stage II, MI s/p PCI->OM1 in 2019 and NSTEMI 1/17/24 s/p Cardiac cath w/mid LAD stenosis s/p PTCA c/b dissection, now s/p recent NSTEMI w/ severe LAD stenosis. Patient presented to [facility] on 9/30/24 with new onset chest pain at rest. In the ED, he was ruled in for NSTEMI. 10/1/24 TTE revealed LVEF 60%, dilated ascending aorta, no significant valvular disease. 10/1/24 s/p Cardiac cath that revealed patent stent in OM1, 80-90% mid-distal LAD stenosis. Patient medically managed and discharged home on 10/2. Pt presented on [facility] on 10/18 and undersent MIDCAB, JOAQUIN occlusion with Dr. Patrick. Arrived to ICU extubated on no drips. POD 1 started on BB, endocrine consulted for labile blood sugars with A1c of 9. Transferred to 9La. Tubes kept for high output. POD 2 insulin redosed. Plan to start on ASA/eliquis for afib once tubes are out.     Plan:    Neurovascular:   -Pain well controlled with current regimen. PRN's: oxy    Cardiovascular:   -Hemodynamically stable.   -Monitor: BP, HR, tele  -post op MIDCAB    -monitoring CT output, will remove if able later today.     -c/w ASA, add eliquis once drains removed    -c/w metop 25mg Q 8h  -HLD    -c/w lipitor    Respiratory:   -Oxygenating well on room air  -Encourage continued use of IS 10x/hr and frequent ambulation  -CXR: WNL    GI:  -GI PPX: protonix  -PO Diet  -Bowel Regimen: miralax, senna    Renal / :  -Continue to monitor renal function: BUN/Cr 31/.91  -Monitor I/O's daily     Endocrine:    -DM    -increased DM regimen per endo reccs: dorota 14, lis 8, ISS  -A1c: 9  -TSH: 1.7    Hematologic:  -CBC: H/H- 11.2/33  -Coagulation Panel.    ID:  -Temperature: 37.2  -CBC: WBC- 11.4  -Continue to observe for SIRS/Sepsis Syndrome.    Prophylaxis:  -DVT prophylaxis with SQH  -Continue with SCD's b/l while patient is at rest     Disposition:  -Discharge home once patient is medically ready

## 2024-10-21 PROBLEM — I10 ESSENTIAL (PRIMARY) HYPERTENSION: Chronic | Status: ACTIVE | Noted: 2024-10-17

## 2024-10-21 PROBLEM — I48.91 UNSPECIFIED ATRIAL FIBRILLATION: Chronic | Status: ACTIVE | Noted: 2024-10-17

## 2024-10-21 PROBLEM — E11.9 TYPE 2 DIABETES MELLITUS WITHOUT COMPLICATIONS: Chronic | Status: ACTIVE | Noted: 2024-10-17

## 2024-10-21 PROBLEM — N18.2 CHRONIC KIDNEY DISEASE, STAGE 2 (MILD): Chronic | Status: ACTIVE | Noted: 2024-10-17

## 2024-10-21 PROBLEM — I25.10 ATHEROSCLEROTIC HEART DISEASE OF NATIVE CORONARY ARTERY WITHOUT ANGINA PECTORIS: Chronic | Status: ACTIVE | Noted: 2024-10-17

## 2024-10-21 PROBLEM — I21.4 NON-ST ELEVATION (NSTEMI) MYOCARDIAL INFARCTION: Chronic | Status: ACTIVE | Noted: 2024-10-17

## 2024-10-21 PROBLEM — E78.5 HYPERLIPIDEMIA, UNSPECIFIED: Chronic | Status: ACTIVE | Noted: 2024-10-17

## 2024-10-21 LAB
ADD ON TEST-SPECIMEN IN LAB: SIGNIFICANT CHANGE UP
ANION GAP SERPL CALC-SCNC: 8 MMOL/L — SIGNIFICANT CHANGE UP (ref 5–17)
BUN SERPL-MCNC: 26 MG/DL — HIGH (ref 7–23)
CALCIUM SERPL-MCNC: 8.2 MG/DL — LOW (ref 8.4–10.5)
CHLORIDE SERPL-SCNC: 101 MMOL/L — SIGNIFICANT CHANGE UP (ref 96–108)
CO2 SERPL-SCNC: 24 MMOL/L — SIGNIFICANT CHANGE UP (ref 22–31)
CREAT SERPL-MCNC: 0.99 MG/DL — SIGNIFICANT CHANGE UP (ref 0.5–1.3)
EGFR: 78 ML/MIN/1.73M2 — SIGNIFICANT CHANGE UP
GLUCOSE BLDC GLUCOMTR-MCNC: 162 MG/DL — HIGH (ref 70–99)
GLUCOSE BLDC GLUCOMTR-MCNC: 200 MG/DL — HIGH (ref 70–99)
GLUCOSE BLDC GLUCOMTR-MCNC: 287 MG/DL — HIGH (ref 70–99)
GLUCOSE BLDC GLUCOMTR-MCNC: 93 MG/DL — SIGNIFICANT CHANGE UP (ref 70–99)
GLUCOSE SERPL-MCNC: 204 MG/DL — HIGH (ref 70–99)
HCT VFR BLD CALC: 34.8 % — LOW (ref 39–50)
HGB BLD-MCNC: 10.9 G/DL — LOW (ref 13–17)
ISTAT ARTERIAL BE: -3 MMOL/L — LOW (ref -2–3)
ISTAT ARTERIAL GLUCOSE: 77 MG/DL — SIGNIFICANT CHANGE UP (ref 70–99)
ISTAT ARTERIAL HCO3: 22 MMOL/L — SIGNIFICANT CHANGE UP (ref 22–26)
ISTAT ARTERIAL HEMATOCRIT: 39 % — SIGNIFICANT CHANGE UP (ref 39–50)
ISTAT ARTERIAL HEMOGLOBIN: 13.3 G/DL — SIGNIFICANT CHANGE UP (ref 13–17)
ISTAT ARTERIAL IONIZED CALCIUM: 1.12 MMOL/L — SIGNIFICANT CHANGE UP (ref 1.12–1.3)
ISTAT ARTERIAL PCO2: 39 MMHG — SIGNIFICANT CHANGE UP (ref 35–45)
ISTAT ARTERIAL PH: 7.36 — SIGNIFICANT CHANGE UP (ref 7.35–7.45)
ISTAT ARTERIAL PO2: 71 MMHG — LOW (ref 80–105)
ISTAT ARTERIAL POTASSIUM: 3.7 MMOL/L — SIGNIFICANT CHANGE UP (ref 3.5–5.3)
ISTAT ARTERIAL SO2: 93 % — LOW (ref 95–98)
ISTAT ARTERIAL SODIUM: 138 MMOL/L — SIGNIFICANT CHANGE UP (ref 135–145)
ISTAT ARTERIAL TCO2: 23 MMOL/L — SIGNIFICANT CHANGE UP (ref 22–31)
MAGNESIUM SERPL-MCNC: 2.5 MG/DL — SIGNIFICANT CHANGE UP (ref 1.6–2.6)
MCHC RBC-ENTMCNC: 27 PG — SIGNIFICANT CHANGE UP (ref 27–34)
MCHC RBC-ENTMCNC: 31.3 GM/DL — LOW (ref 32–36)
MCV RBC AUTO: 86.4 FL — SIGNIFICANT CHANGE UP (ref 80–100)
NRBC # BLD: 0 /100 WBCS — SIGNIFICANT CHANGE UP (ref 0–0)
PLATELET # BLD AUTO: 219 K/UL — SIGNIFICANT CHANGE UP (ref 150–400)
POTASSIUM SERPL-MCNC: 4.9 MMOL/L — SIGNIFICANT CHANGE UP (ref 3.5–5.3)
POTASSIUM SERPL-SCNC: 4.9 MMOL/L — SIGNIFICANT CHANGE UP (ref 3.5–5.3)
RBC # BLD: 4.03 M/UL — LOW (ref 4.2–5.8)
RBC # FLD: 13.2 % — SIGNIFICANT CHANGE UP (ref 10.3–14.5)
SODIUM SERPL-SCNC: 133 MMOL/L — LOW (ref 135–145)
WBC # BLD: 12.17 K/UL — HIGH (ref 3.8–10.5)
WBC # FLD AUTO: 12.17 K/UL — HIGH (ref 3.8–10.5)

## 2024-10-21 PROCEDURE — 99232 SBSQ HOSP IP/OBS MODERATE 35: CPT

## 2024-10-21 PROCEDURE — 71045 X-RAY EXAM CHEST 1 VIEW: CPT | Mod: 26,76

## 2024-10-21 RX ORDER — FUROSEMIDE 40 MG
20 TABLET ORAL ONCE
Refills: 0 | Status: COMPLETED | OUTPATIENT
Start: 2024-10-21 | End: 2024-10-21

## 2024-10-21 RX ORDER — FUROSEMIDE 40 MG
20 TABLET ORAL DAILY
Refills: 0 | Status: DISCONTINUED | OUTPATIENT
Start: 2024-10-22 | End: 2024-10-23

## 2024-10-21 RX ORDER — INSULIN GLARGINE,HUM.REC.ANLOG 100/ML
18 VIAL (ML) SUBCUTANEOUS AT BEDTIME
Refills: 0 | Status: DISCONTINUED | OUTPATIENT
Start: 2024-10-21 | End: 2024-10-23

## 2024-10-21 RX ORDER — HYDROMORPHONE HCL/0.9% NACL/PF 6 MG/30 ML
0.25 PATIENT CONTROLLED ANALGESIA SYRINGE INTRAVENOUS ONCE
Refills: 0 | Status: DISCONTINUED | OUTPATIENT
Start: 2024-10-21 | End: 2024-10-21

## 2024-10-21 RX ORDER — INSULIN LISPRO 100/ML
14 VIAL (ML) SUBCUTANEOUS
Refills: 0 | Status: DISCONTINUED | OUTPATIENT
Start: 2024-10-21 | End: 2024-10-23

## 2024-10-21 RX ORDER — METOPROLOL TARTRATE 50 MG
37.5 TABLET ORAL EVERY 12 HOURS
Refills: 0 | Status: DISCONTINUED | OUTPATIENT
Start: 2024-10-21 | End: 2024-10-23

## 2024-10-21 RX ORDER — APIXABAN 5 MG/1
5 TABLET, FILM COATED ORAL EVERY 12 HOURS
Refills: 0 | Status: DISCONTINUED | OUTPATIENT
Start: 2024-10-21 | End: 2024-10-23

## 2024-10-21 RX ADMIN — APIXABAN 5 MILLIGRAM(S): 5 TABLET, FILM COATED ORAL at 17:51

## 2024-10-21 RX ADMIN — GABAPENTIN 100 MILLIGRAM(S): 300 CAPSULE ORAL at 22:04

## 2024-10-21 RX ADMIN — Medication 80 MILLIGRAM(S): at 22:04

## 2024-10-21 RX ADMIN — Medication 6: at 12:20

## 2024-10-21 RX ADMIN — Medication 500 MILLIGRAM(S): at 12:35

## 2024-10-21 RX ADMIN — Medication 0.4 MILLIGRAM(S): at 22:04

## 2024-10-21 RX ADMIN — Medication 8 UNIT(S): at 07:31

## 2024-10-21 RX ADMIN — Medication 2: at 17:23

## 2024-10-21 RX ADMIN — Medication 14 UNIT(S): at 17:23

## 2024-10-21 RX ADMIN — Medication 18 UNIT(S): at 22:04

## 2024-10-21 RX ADMIN — Medication 37.5 MILLIGRAM(S): at 17:51

## 2024-10-21 RX ADMIN — POLYETHYLENE GLYCOL 3350 17 GRAM(S): 17 POWDER, FOR SOLUTION ORAL at 12:35

## 2024-10-21 RX ADMIN — PANTOPRAZOLE SODIUM 40 MILLIGRAM(S): 40 TABLET, DELAYED RELEASE ORAL at 07:40

## 2024-10-21 RX ADMIN — Medication 0.6 MILLIGRAM(S): at 12:35

## 2024-10-21 RX ADMIN — Medication 37.5 MILLIGRAM(S): at 07:50

## 2024-10-21 RX ADMIN — Medication 0.25 MILLIGRAM(S): at 09:19

## 2024-10-21 RX ADMIN — Medication 2 TABLET(S): at 22:04

## 2024-10-21 RX ADMIN — HEPARIN SODIUM 5000 UNIT(S): 10000 INJECTION INTRAVENOUS; SUBCUTANEOUS at 07:40

## 2024-10-21 RX ADMIN — Medication 14 UNIT(S): at 12:20

## 2024-10-21 RX ADMIN — GABAPENTIN 100 MILLIGRAM(S): 300 CAPSULE ORAL at 15:02

## 2024-10-21 RX ADMIN — SODIUM CHLORIDE 3 MILLILITER(S): 9 INJECTION, SOLUTION INTRAMUSCULAR; INTRAVENOUS; SUBCUTANEOUS at 14:57

## 2024-10-21 RX ADMIN — GABAPENTIN 100 MILLIGRAM(S): 300 CAPSULE ORAL at 07:40

## 2024-10-21 RX ADMIN — Medication 0.25 MILLIGRAM(S): at 09:34

## 2024-10-21 RX ADMIN — SODIUM CHLORIDE 3 MILLILITER(S): 9 INJECTION, SOLUTION INTRAMUSCULAR; INTRAVENOUS; SUBCUTANEOUS at 07:30

## 2024-10-21 RX ADMIN — Medication 20 MILLIGRAM(S): at 09:18

## 2024-10-21 RX ADMIN — Medication 81 MILLIGRAM(S): at 12:35

## 2024-10-21 RX ADMIN — MUPIROCIN 1 APPLICATION(S): 20 OINTMENT TOPICAL at 17:51

## 2024-10-21 RX ADMIN — HEPARIN SODIUM 5000 UNIT(S): 10000 INJECTION INTRAVENOUS; SUBCUTANEOUS at 15:02

## 2024-10-21 RX ADMIN — Medication 2: at 07:31

## 2024-10-21 RX ADMIN — SODIUM CHLORIDE 3 MILLILITER(S): 9 INJECTION, SOLUTION INTRAMUSCULAR; INTRAVENOUS; SUBCUTANEOUS at 21:48

## 2024-10-21 NOTE — PROGRESS NOTE ADULT - SUBJECTIVE AND OBJECTIVE BOX
Patient discussed on morning rounds with Dr. Patrick    OPERATION & DATE: 10/18 MIDCAB, JOAQUIN clip    SUBJECTIVE ASSESSMENT: reporting some L sided CP which has gotten worse throughout the morning.     VITAL SIGNS:  Vital Signs Last 24 Hrs  T(C): 36.3 (21 Oct 2024 09:24), Max: 36.6 (20 Oct 2024 17:05)  T(F): 97.4 (21 Oct 2024 09:24), Max: 97.9 (20 Oct 2024 17:05)  HR: 100 (21 Oct 2024 09:00) (89 - 100)  BP: 104/58 (21 Oct 2024 09:00) (100/55 - 128/69)  BP(mean): 77 (21 Oct 2024 09:00) (72 - 93)  RR: 20 (21 Oct 2024 09:00) (14 - 20)  SpO2: 97% (21 Oct 2024 09:00) (97% - 100%)    Parameters below as of 21 Oct 2024 09:00  Patient On (Oxygen Delivery Method): room air      I&O's Detail    20 Oct 2024 07:01  -  21 Oct 2024 07:00  --------------------------------------------------------  IN:    Oral Fluid: 1620 mL  Total IN: 1620 mL    OUT:    Bulb (mL): 5 mL    Chest Tube (mL): 220 mL    Voided (mL): 1850 mL  Total OUT: 2075 mL    Total NET: -455 mL        CHEST TUBE:  L pleural tube  LOWELL DRAIN:  none  EPICARDIAL WIRES: none  STITCHES: 1 tie down  STAPLES: none  CALHOUN: none  CENTRAL LINE: none  MIDLINE/PICC: none  WOUND VAC: none    PHYSICAL EXAM:  General: resting comfortably in bed in NAD  Neurological: AOx3. Motor skills grossly intact  Cardiovascular: Normal S1/S2. Regular rate/rhythm. No murmurs  Respiratory: Lungs CTA bilaterally. No wheezing or rales  Gastrointestinal: +BS in all 4 quadrants. Non-distended. Soft. Non-tender  Extremities: Strength 5/5 b/l upper/lower extremities. Sensation grossly intact upper/lower extremities. No edema. No calf tenderness.  Vascular: Radial 2+bilaterally, DP 2+ b/l  Incision Sites: thoracotomy incision clean, dry, intact.       LABS:                        10.9   12.17 )-----------( 219      ( 21 Oct 2024 05:30 )             34.8     PT/INR - ( 19 Oct 2024 11:46 )   PT: 15.2 sec;   INR: 1.33          PTT - ( 19 Oct 2024 11:46 )  PTT:33.1 sec  10-21    133[L]  |  101  |  26[H]  ----------------------------<  204[H]  4.9   |  24  |  0.99    Ca    8.2[L]      21 Oct 2024 05:30  Phos  1.9     10-20  Mg     2.5     10-21    TPro  6.8  /  Alb  3.7  /  TBili  0.7  /  DBili  x   /  AST  26  /  ALT  20  /  AlkPhos  92  10-19    Urinalysis Basic - ( 21 Oct 2024 05:30 )    Color: x / Appearance: x / SG: x / pH: x  Gluc: 204 mg/dL / Ketone: x  / Bili: x / Urobili: x   Blood: x / Protein: x / Nitrite: x   Leuk Esterase: x / RBC: x / WBC x   Sq Epi: x / Non Sq Epi: x / Bacteria: x      MEDICATIONS  (STANDING):  ascorbic acid 500 milliGRAM(s) Oral daily  aspirin enteric coated 81 milliGRAM(s) Oral daily  atorvastatin 80 milliGRAM(s) Oral at bedtime  bisacodyl Suppository 10 milliGRAM(s) Rectal once  chlorhexidine 2% Cloths 1 Application(s) Topical daily  colchicine 0.6 milliGRAM(s) Oral daily  dextrose 5%. 1000 milliLiter(s) (50 mL/Hr) IV Continuous <Continuous>  dextrose 5%. 1000 milliLiter(s) (100 mL/Hr) IV Continuous <Continuous>  dextrose 50% Injectable 25 Gram(s) IV Push once  dextrose 50% Injectable 25 Gram(s) IV Push once  dextrose 50% Injectable 12.5 Gram(s) IV Push once  gabapentin 100 milliGRAM(s) Oral every 8 hours  glucagon  Injectable 1 milliGRAM(s) IntraMuscular once  heparin   Injectable 5000 Unit(s) SubCutaneous every 8 hours  insulin glargine Injectable (LANTUS) 14 Unit(s) SubCutaneous at bedtime  insulin lispro (ADMELOG) corrective regimen sliding scale   SubCutaneous Before meals and at bedtime  insulin lispro Injectable (ADMELOG) 14 Unit(s) SubCutaneous three times a day before meals  metoprolol tartrate 37.5 milliGRAM(s) Oral every 12 hours  mupirocin 2% Nasal 1 Application(s) Both Nostrils two times a day  pantoprazole    Tablet 40 milliGRAM(s) Oral before breakfast  polyethylene glycol 3350 17 Gram(s) Oral daily  senna 2 Tablet(s) Oral at bedtime  sodium chloride 0.9% lock flush 3 milliLiter(s) IV Push every 8 hours  sodium chloride 0.9%. 1000 milliLiter(s) (10 mL/Hr) IV Continuous <Continuous>  tamsulosin 0.4 milliGRAM(s) Oral at bedtime    MEDICATIONS  (PRN):  dextrose Oral Gel 15 Gram(s) Oral once PRN Blood Glucose LESS THAN 70 milliGRAM(s)/deciliter  oxyCODONE    IR 5 milliGRAM(s) Oral every 4 hours PRN Moderate Pain (4 - 6)    RADIOLOGY & ADDITIONAL TESTS:

## 2024-10-21 NOTE — CHART NOTE - NSCHARTNOTEFT_GEN_A_CORE
Left Radial artery evaluation for use as a conduit in coronary artery bypass surgery.     Bother lower and upper corcoran arch pulse in left hand were present by doppler during radial artery compression.     Findings: Radial artery suitable for harvest as evidence by Allens test

## 2024-10-21 NOTE — PROGRESS NOTE ADULT - SUBJECTIVE AND OBJECTIVE BOX
SUBJECTIVE / INTERVAL HPI: Patient was seen and examined this morning. Pt is s/p MIDCAB 10/18. Did not require insulin drip postop. Planned for discharge home today.  Endocrine is consulted for type 2 diabetes management (chronic,  exacerbated).      CAPILLARY BLOOD GLUCOSE & INSULIN RECEIVED  225 mg/dL (10-19 @ bedtime) - Lantus 10 + lispro 4  159 mg/dL (10-20 @ 05:30)  175 mg/dL (10-20 @ 06:35)  180 mg/dL (10-20 @ 07:41) - Lispro 5+2  237 mg/dL (10-20 @ 11:14) - Lispro 8+4  294 mg/dL (10-20 @ 16:31) - Lispro 8+6  246 mg/dL (10-20 @ 21:55) - Lantus 14 + lispro 4  204 mg/dL (10-21 @ 05:30)  200 mg/dL (10-21 @ 06:35) - Lispro 8+2  mg/dL (10-21 @ lunch)      REVIEW OF SYSTEMS  Constitutional:  Negative fever, chills or loss of appetite.  Eyes:  Negative blurry vision or double vision.  Cardiovascular:  Negative for chest pain or palpitations.  Respiratory:  Negative for cough, wheezing, or shortness of breath.    Gastrointestinal:  Negative for nausea, vomiting, diarrhea, constipation, or abdominal pain.  Genitourinary:  Negative frequency, urgency or dysuria.  Neurologic:  No headache, confusion, dizziness, lightheadedness.    PHYSICAL EXAM  Vital Signs Last 24 Hrs  T(C): 36.3 (21 Oct 2024 09:24), Max: 36.6 (20 Oct 2024 17:05)  T(F): 97.4 (21 Oct 2024 09:24), Max: 97.9 (20 Oct 2024 17:05)  HR: 99 (21 Oct 2024 07:49) (89 - 99)  BP: 103/63 (21 Oct 2024 07:49) (100/55 - 128/69)  BP(mean): 76 (21 Oct 2024 07:49) (72 - 93)  RR: 15 (21 Oct 2024 06:59) (14 - 20)  SpO2: 97% (21 Oct 2024 06:59) (97% - 100%)    Parameters below as of 21 Oct 2024 06:59  Patient On (Oxygen Delivery Method): room air    Constitutional: Awake, alert, in no acute distress.   HEENT: Normocephalic, atraumatic, ALVERTO.  Respiratory: Lungs clear to ausculation bilaterally.   Cardiovascular: regular rhythm, normal S1 and S2, no audible murmurs.   GI: soft, non-tender, non-distended, bowel sounds present.  Extremities: No lower extremity edema.  Psychiatric: AAO x 3. Normal affect/mood.     LABS  CBC - WBC/HGB/HTC/PLT: 12.17/10.9/34.8/219 (10-21-24)  BMP - Na/K/Cl/Bicarb/BUN/Cr/Gluc/AG/eGFR: 133/4.9/101/24/26/0.99/204/8/78 (10-21-24)  Ca - 8.2 (10-21-24)  Phos - -- (10-21-24)  Mg - 2.5 (10-21-24)  LFT - Alb/Tprot/Tbili/Dbili/AlkPhos/ALT/AST: 3.7/--/0.7/--/92/20/26 (10-19-24)  PT/aPTT/INR: 15.2/33.1/1.33 (10-19-24)   Thyroid Stimulating Hormone, Serum: 4.250 (10-17-24)      MEDICATIONS  MEDICATIONS  (STANDING):  ascorbic acid 500 milliGRAM(s) Oral daily  aspirin enteric coated 81 milliGRAM(s) Oral daily  atorvastatin 80 milliGRAM(s) Oral at bedtime  bisacodyl Suppository 10 milliGRAM(s) Rectal once  chlorhexidine 2% Cloths 1 Application(s) Topical daily  colchicine 0.6 milliGRAM(s) Oral daily  dextrose 5%. 1000 milliLiter(s) (100 mL/Hr) IV Continuous <Continuous>  dextrose 5%. 1000 milliLiter(s) (50 mL/Hr) IV Continuous <Continuous>  dextrose 50% Injectable 25 Gram(s) IV Push once  dextrose 50% Injectable 12.5 Gram(s) IV Push once  dextrose 50% Injectable 25 Gram(s) IV Push once  gabapentin 100 milliGRAM(s) Oral every 8 hours  glucagon  Injectable 1 milliGRAM(s) IntraMuscular once  heparin   Injectable 5000 Unit(s) SubCutaneous every 8 hours  insulin glargine Injectable (LANTUS) 14 Unit(s) SubCutaneous at bedtime  insulin lispro (ADMELOG) corrective regimen sliding scale   SubCutaneous Before meals and at bedtime  insulin lispro Injectable (ADMELOG) 8 Unit(s) SubCutaneous three times a day before meals  metoprolol tartrate 37.5 milliGRAM(s) Oral every 12 hours  mupirocin 2% Nasal 1 Application(s) Both Nostrils two times a day  pantoprazole    Tablet 40 milliGRAM(s) Oral before breakfast  polyethylene glycol 3350 17 Gram(s) Oral daily  senna 2 Tablet(s) Oral at bedtime  sodium chloride 0.9% lock flush 3 milliLiter(s) IV Push every 8 hours  sodium chloride 0.9%. 1000 milliLiter(s) (10 mL/Hr) IV Continuous <Continuous>  tamsulosin 0.4 milliGRAM(s) Oral at bedtime    MEDICATIONS  (PRN):  dextrose Oral Gel 15 Gram(s) Oral once PRN Blood Glucose LESS THAN 70 milliGRAM(s)/deciliter  oxyCODONE    IR 5 milliGRAM(s) Oral every 4 hours PRN Moderate Pain (4 - 6)   SUBJECTIVE / INTERVAL HPI: Patient was seen and examined this morning. Utilized  # [id number]. HHA at bedside. Reports moderate pain. Appetite is not great. Mostly eating fruit and "natural" juice from home - strawberries, pineapple, and lemon.  Endocrine is consulted for type 2 diabetes management (chronic,  exacerbated).  Weekend consult. Events of weekend reviewed. Pt is s/p MIDCAB 10/18. Did not require insulin drip postop.   Home meds: Lantus 30 units at bedtime; has never been on shortacting insulin. Invokana 300mg daily, and glipizide - unknown dose. There was one order for ozempic in surescripts but he doesn't not recall taking. Did not tolerate due to GI SE in the past.  Monitors glucose once daily in AM - 110-140.  Diet - Breakfast: green banana and milk, Lunch and dinner: chicken, green veggies, and 2 spoons rice. 2 cups of juice per day. No soda. No fruit. No sweets.      CAPILLARY BLOOD GLUCOSE & INSULIN RECEIVED  225 mg/dL (10-19 @ bedtime) - Lantus 10 + lispro 4  159 mg/dL (10-20 @ 05:30)  175 mg/dL (10-20 @ 06:35)  180 mg/dL (10-20 @ 07:41) - Lispro 5+2  237 mg/dL (10-20 @ 11:14) - Lispro 8+4  294 mg/dL (10-20 @ 16:31) - Lispro 8+6. Fruit juice from home and fruit  246 mg/dL (10-20 @ 21:55) - Lantus 14 + lispro 4  204 mg/dL (10-21 @ 05:30)  200 mg/dL (10-21 @ 06:35) - Lispro 8+2. fruit cup.  287 mg/dL (10-21 @ lunch) - Lispro 14+6.      REVIEW OF SYSTEMS  Constitutional:  Negative fever, chills or loss of appetite.  Eyes:  Negative blurry vision or double vision.  Cardiovascular:  Negative for chest pain or palpitations.  Respiratory:  Negative for cough, wheezing, or shortness of breath.    Gastrointestinal:  Negative for nausea, vomiting, diarrhea, constipation, or abdominal pain.  Genitourinary:  Negative frequency, urgency or dysuria.  Neurologic:  No headache, confusion, dizziness, lightheadedness.    PHYSICAL EXAM  Vital Signs Last 24 Hrs  T(C): 36.3 (21 Oct 2024 09:24), Max: 36.6 (20 Oct 2024 17:05)  T(F): 97.4 (21 Oct 2024 09:24), Max: 97.9 (20 Oct 2024 17:05)  HR: 99 (21 Oct 2024 07:49) (89 - 99)  BP: 103/63 (21 Oct 2024 07:49) (100/55 - 128/69)  BP(mean): 76 (21 Oct 2024 07:49) (72 - 93)  RR: 15 (21 Oct 2024 06:59) (14 - 20)  SpO2: 97% (21 Oct 2024 06:59) (97% - 100%)    Parameters below as of 21 Oct 2024 06:59  Patient On (Oxygen Delivery Method): room air    Constitutional: Awake, alert, in no acute distress.   HEENT: Normocephalic, atraumatic, ALVERTO.  Respiratory: Lungs clear to ausculation bilaterally.   Cardiovascular: regular rhythm, normal S1 and S2, no audible murmurs.   GI: soft, non-tender, non-distended, bowel sounds present.  Extremities: No lower extremity edema.  Psychiatric: AAO x 3. Normal affect/mood.     LABS  CBC - WBC/HGB/HTC/PLT: 12.17/10.9/34.8/219 (10-21-24)  BMP - Na/K/Cl/Bicarb/BUN/Cr/Gluc/AG/eGFR: 133/4.9/101/24/26/0.99/204/8/78 (10-21-24)  Ca - 8.2 (10-21-24)  Phos - -- (10-21-24)  Mg - 2.5 (10-21-24)  LFT - Alb/Tprot/Tbili/Dbili/AlkPhos/ALT/AST: 3.7/--/0.7/--/92/20/26 (10-19-24)  PT/aPTT/INR: 15.2/33.1/1.33 (10-19-24)   Thyroid Stimulating Hormone, Serum: 4.250 (10-17-24)      MEDICATIONS  MEDICATIONS  (STANDING):  ascorbic acid 500 milliGRAM(s) Oral daily  aspirin enteric coated 81 milliGRAM(s) Oral daily  atorvastatin 80 milliGRAM(s) Oral at bedtime  bisacodyl Suppository 10 milliGRAM(s) Rectal once  chlorhexidine 2% Cloths 1 Application(s) Topical daily  colchicine 0.6 milliGRAM(s) Oral daily  dextrose 5%. 1000 milliLiter(s) (100 mL/Hr) IV Continuous <Continuous>  dextrose 5%. 1000 milliLiter(s) (50 mL/Hr) IV Continuous <Continuous>  dextrose 50% Injectable 25 Gram(s) IV Push once  dextrose 50% Injectable 12.5 Gram(s) IV Push once  dextrose 50% Injectable 25 Gram(s) IV Push once  gabapentin 100 milliGRAM(s) Oral every 8 hours  glucagon  Injectable 1 milliGRAM(s) IntraMuscular once  heparin   Injectable 5000 Unit(s) SubCutaneous every 8 hours  insulin glargine Injectable (LANTUS) 14 Unit(s) SubCutaneous at bedtime  insulin lispro (ADMELOG) corrective regimen sliding scale   SubCutaneous Before meals and at bedtime  insulin lispro Injectable (ADMELOG) 8 Unit(s) SubCutaneous three times a day before meals  metoprolol tartrate 37.5 milliGRAM(s) Oral every 12 hours  mupirocin 2% Nasal 1 Application(s) Both Nostrils two times a day  pantoprazole    Tablet 40 milliGRAM(s) Oral before breakfast  polyethylene glycol 3350 17 Gram(s) Oral daily  senna 2 Tablet(s) Oral at bedtime  sodium chloride 0.9% lock flush 3 milliLiter(s) IV Push every 8 hours  sodium chloride 0.9%. 1000 milliLiter(s) (10 mL/Hr) IV Continuous <Continuous>  tamsulosin 0.4 milliGRAM(s) Oral at bedtime    MEDICATIONS  (PRN):  dextrose Oral Gel 15 Gram(s) Oral once PRN Blood Glucose LESS THAN 70 milliGRAM(s)/deciliter  oxyCODONE    IR 5 milliGRAM(s) Oral every 4 hours PRN Moderate Pain (4 - 6)

## 2024-10-21 NOTE — PROGRESS NOTE ADULT - ASSESSMENT
80 YO Mauritian-speaking Male with PMHx of HTN, HLD, DMII (A1C 11.7%), pAF (on Eliquis), HFpEF, CKD-stage II, MI s/p PCI->OM1 in 2019 and NSTEMI 1/17/24 s/p Cardiac cath w/mid LAD stenosis s/p PTCA c/b dissection, now s/p recent NSTEMI w/ severe LAD stenosis. Patient presented to [facility] on 9/30/24 with new onset chest pain at rest. In the ED, he was ruled in for NSTEMI. 10/1/24 TTE revealed LVEF 60%, dilated ascending aorta, no significant valvular disease. 10/1/24 s/p Cardiac cath that revealed patent stent in OM1, 80-90% mid-distal LAD stenosis. Patient medically managed and discharged home on 10/2. Pt presented on [facility] on 10/18 and undersent MIDCAB, JOAQUIN occlusion with Dr. Patrick. Arrived to ICU extubated on no drips. POD 1 started on BB, endocrine consulted for labile blood sugars with A1c of 9. Transferred to 9La. Tubes kept for high output. POD 2 insulin redosed. Plan to start on ASA/eliquis for afib once tubes are out. POD 3, pt with L sided c/p, likely related to pericarditis. No medrol dose pack given a1c of 9 and uncontrolled sugars. Pending endo reccs. High output from pleural tube  yesterday, so deferring removal for the morning. Likely DC tomorrow.     Plan:    Neurovascular:   -Pain well controlled with current regimen. PRN's: oxy    Cardiovascular:   -Hemodynamically stable.   -Monitor: BP, HR, tele  -post op MIDCAB    -monitoring CT output, will remove if able later today.     -c/w ASA, add eliquis once drains removed    -metop 37.5 BID    -starting daily lasix given CT output  -HLD    -c/w lipitor    Respiratory:   -Oxygenating well on room air  -Encourage continued use of IS 10x/hr and frequent ambulation  -CXR: WNL    GI:  -GI PPX: protonix  -PO Diet  -Bowel Regimen: miralax, senna, dulcolax    Renal / :  -Continue to monitor renal function: BUN/Cr 26/.99  -Monitor I/O's daily     Endocrine:    -DM    -increased DM regimen per endo reccs: Lis 14 premeal, awaiting lantus reccs  -A1c: 9  -TSH: 1.7    Hematologic:  -CBC: H/H- 10.9/35  -Coagulation Panel.    ID:  -Temperature: 36.7  -CBC: WBC- 12  -Continue to observe for SIRS/Sepsis Syndrome.    Prophylaxis:  -DVT prophylaxis with SQH  -Continue with SCD's b/l while patient is at rest     Disposition:  -Discharge home once patient is medically ready

## 2024-10-21 NOTE — PROGRESS NOTE ADULT - ASSESSMENT
This is a 79 year old man with PMH of type 2 DM (A1c 11.7%, was on Lantus 30u at home, invokana, glipizide), pAF on AC, CAD s/p prior PCI, HTN, HLD admitted for NSTEMI, s/p MIDCAB 10/18/24. In addition, TSH 4.25 noted - this is appropriate for age.     Endocrine is consulted for type 2 diabetes management (chronic,  exacerbated).    24 hour glucose data reviewed.  3 labs reviewed - normal/abnormal  Insulin adjustment    A1C: 9.1 %  BUN: 26  Creatinine: 0.99  GFR: 78  Weight: 65.3  BMI: 23.2  EF: 60% preop   This is a 79 year old man with PMH of type 2 DM (A1c 11.7%, was on Lantus 30u at home, invokana, glipizide), pAF on AC, CAD s/p prior PCI, HTN, HLD admitted for NSTEMI, s/p MIDCAB 10/18/24. In addition, TSH 4.25 noted - this is appropriate for age.     Endocrine is consulted for type 2 diabetes management (chronic,  exacerbated).    Had extensive discussion about impact of fruit and juice on glucose, and it's low nutritional yield Advised eating whole fruits instead of juice, no more than 3 serving spread out throughout day. Educated on low glycemic fruits. Since his appetite is low here, he is amenable to try Glucerna instead of juice to better support nutrition needs and surgical site healing.    24 hour glucose data reviewed.  Insulin adjustment    A1C: 9.1 % - Above target.  C-peptide added on this AM.  BUN: 26  Creatinine: 0.99 - normal  GFR: 78  Weight: 65.3  BMI: 23.2 - normal  EF: 60% preop - normal

## 2024-10-21 NOTE — PROGRESS NOTE ADULT - PROBLEM SELECTOR PLAN 1
Type 2 diabetes mellitus with hyperglycemia  - Please continue lantus *** units at bedtime.   - Continue lispro *** units before each meal.  - Continue lispro moderate / low dose sliding scale before meals and at bedtime.  - Patient's fingerstick glucose goal is 100-180 mg/dL.    - For discharge, patient can ***.    - Patient can follow up at discharge with [facility] by calling [phone] to make an appointment.      Case discussed with Dr. Oseguera. Primary team updated. Type 2 diabetes mellitus with hyperglycemia  - Please continue lantus  units at bedtime.   - Continue lispro  units before each meal.  - Continue lispro moderate dose sliding scale before meals and at bedtime.  - Patient's fingerstick glucose goal is 100-180 mg/dL.    - For discharge: Basal, dose TBD + premeal, dose TBD. Continue invokana. Stop glipizide.  - Patient can follow up at discharge with [facility] by calling [phone] to make an appointment.      Case discussed with Dr. Oseguera. Primary team updated. Type 2 diabetes mellitus with hyperglycemia  - Please continue lantus  units at bedtime.   - Continue lispro  units before each meal.  - Continue lispro moderate dose sliding scale before meals and at bedtime.  - Patient's fingerstick glucose goal is 100-180 mg/dL.    - For discharge: Basal, dose TBD + premeal, dose TBD. Continue invokana. Stop glipizide. Increased glucose monitoring, 4x/day before meals and at bedtime.  - Patient can follow up at discharge with [facility] by calling [phone] to make an appointment.      Case discussed with Dr. Oseguera. Primary team updated. Type 2 diabetes mellitus with hyperglycemia  - Please increase lantus to 18  units at bedtime.   - Increase lispro to 14 units before each meal.  - Continue lispro moderate dose sliding scale before meals and at bedtime.  - Patient's fingerstick glucose goal is 100-180 mg/dL.    - For discharge: Basal, dose TBD + premeal, dose TBD. Continue invokana. Stop glipizide. Increased glucose monitoring, 4x/day before meals and at bedtime.  - Patient can follow up at discharge with [facility] by calling [phone] to make an appointment.      Case discussed with Dr. Oseguera. Primary team updated.

## 2024-10-21 NOTE — CHART NOTE - NSCHARTNOTEFT_GEN_A_CORE
CT Removal:    Pt seen and examined at bedside.  Case discussed with Dr. Patrick and is recommending removal of L pleural CT.  Minimal output from CT.  No air leak appreciated.  CT removed without incident.  Occlusive dressing placed. Follow up CXR with no obvious PTX noted.  Pt tolerated procedure well and remained hemodynamically stable.

## 2024-10-22 LAB
ANION GAP SERPL CALC-SCNC: 7 MMOL/L — SIGNIFICANT CHANGE UP (ref 5–17)
BUN SERPL-MCNC: 24 MG/DL — HIGH (ref 7–23)
C PEPTIDE SERPL-MCNC: 3.1 NG/ML — SIGNIFICANT CHANGE UP (ref 1.1–4.4)
CALCIUM SERPL-MCNC: 7.9 MG/DL — LOW (ref 8.4–10.5)
CHLORIDE SERPL-SCNC: 104 MMOL/L — SIGNIFICANT CHANGE UP (ref 96–108)
CO2 SERPL-SCNC: 25 MMOL/L — SIGNIFICANT CHANGE UP (ref 22–31)
CREAT SERPL-MCNC: 0.84 MG/DL — SIGNIFICANT CHANGE UP (ref 0.5–1.3)
EGFR: 90 ML/MIN/1.73M2 — SIGNIFICANT CHANGE UP
GLUCOSE BLDC GLUCOMTR-MCNC: 119 MG/DL — HIGH (ref 70–99)
GLUCOSE BLDC GLUCOMTR-MCNC: 136 MG/DL — HIGH (ref 70–99)
GLUCOSE BLDC GLUCOMTR-MCNC: 148 MG/DL — HIGH (ref 70–99)
GLUCOSE BLDC GLUCOMTR-MCNC: 181 MG/DL — HIGH (ref 70–99)
GLUCOSE BLDC GLUCOMTR-MCNC: 235 MG/DL — HIGH (ref 70–99)
GLUCOSE SERPL-MCNC: 144 MG/DL — HIGH (ref 70–99)
HCT VFR BLD CALC: 30.1 % — LOW (ref 39–50)
HGB BLD-MCNC: 10 G/DL — LOW (ref 13–17)
MAGNESIUM SERPL-MCNC: 2.4 MG/DL — SIGNIFICANT CHANGE UP (ref 1.6–2.6)
MCHC RBC-ENTMCNC: 28.2 PG — SIGNIFICANT CHANGE UP (ref 27–34)
MCHC RBC-ENTMCNC: 33.2 GM/DL — SIGNIFICANT CHANGE UP (ref 32–36)
MCV RBC AUTO: 85 FL — SIGNIFICANT CHANGE UP (ref 80–100)
NRBC # BLD: 0 /100 WBCS — SIGNIFICANT CHANGE UP (ref 0–0)
PLATELET # BLD AUTO: 211 K/UL — SIGNIFICANT CHANGE UP (ref 150–400)
POTASSIUM SERPL-MCNC: 4.4 MMOL/L — SIGNIFICANT CHANGE UP (ref 3.5–5.3)
POTASSIUM SERPL-SCNC: 4.4 MMOL/L — SIGNIFICANT CHANGE UP (ref 3.5–5.3)
RBC # BLD: 3.54 M/UL — LOW (ref 4.2–5.8)
RBC # FLD: 13.2 % — SIGNIFICANT CHANGE UP (ref 10.3–14.5)
SODIUM SERPL-SCNC: 136 MMOL/L — SIGNIFICANT CHANGE UP (ref 135–145)
WBC # BLD: 9.51 K/UL — SIGNIFICANT CHANGE UP (ref 3.8–10.5)
WBC # FLD AUTO: 9.51 K/UL — SIGNIFICANT CHANGE UP (ref 3.8–10.5)

## 2024-10-22 PROCEDURE — 99232 SBSQ HOSP IP/OBS MODERATE 35: CPT

## 2024-10-22 PROCEDURE — 71045 X-RAY EXAM CHEST 1 VIEW: CPT | Mod: 26

## 2024-10-22 RX ORDER — ACETAMINOPHEN 500 MG
1000 TABLET ORAL EVERY 6 HOURS
Refills: 0 | Status: DISCONTINUED | OUTPATIENT
Start: 2024-10-22 | End: 2024-10-23

## 2024-10-22 RX ADMIN — Medication 2: at 17:04

## 2024-10-22 RX ADMIN — GABAPENTIN 100 MILLIGRAM(S): 300 CAPSULE ORAL at 13:02

## 2024-10-22 RX ADMIN — Medication 0.4 MILLIGRAM(S): at 22:41

## 2024-10-22 RX ADMIN — MUPIROCIN 1 APPLICATION(S): 20 OINTMENT TOPICAL at 17:03

## 2024-10-22 RX ADMIN — Medication 500 MILLIGRAM(S): at 11:09

## 2024-10-22 RX ADMIN — Medication 20 MILLIGRAM(S): at 06:13

## 2024-10-22 RX ADMIN — Medication 14 UNIT(S): at 08:27

## 2024-10-22 RX ADMIN — SODIUM CHLORIDE 3 MILLILITER(S): 9 INJECTION, SOLUTION INTRAMUSCULAR; INTRAVENOUS; SUBCUTANEOUS at 06:06

## 2024-10-22 RX ADMIN — Medication 2 TABLET(S): at 22:41

## 2024-10-22 RX ADMIN — MUPIROCIN 1 APPLICATION(S): 20 OINTMENT TOPICAL at 06:12

## 2024-10-22 RX ADMIN — GABAPENTIN 100 MILLIGRAM(S): 300 CAPSULE ORAL at 22:41

## 2024-10-22 RX ADMIN — GABAPENTIN 100 MILLIGRAM(S): 300 CAPSULE ORAL at 06:12

## 2024-10-22 RX ADMIN — Medication 81 MILLIGRAM(S): at 11:08

## 2024-10-22 RX ADMIN — Medication 14 UNIT(S): at 17:05

## 2024-10-22 RX ADMIN — OXYCODONE HYDROCHLORIDE 5 MILLIGRAM(S): 30 TABLET ORAL at 16:57

## 2024-10-22 RX ADMIN — Medication 4: at 11:10

## 2024-10-22 RX ADMIN — APIXABAN 5 MILLIGRAM(S): 5 TABLET, FILM COATED ORAL at 17:03

## 2024-10-22 RX ADMIN — Medication 14 UNIT(S): at 11:11

## 2024-10-22 RX ADMIN — Medication 37.5 MILLIGRAM(S): at 17:03

## 2024-10-22 RX ADMIN — OXYCODONE HYDROCHLORIDE 5 MILLIGRAM(S): 30 TABLET ORAL at 16:20

## 2024-10-22 RX ADMIN — SODIUM CHLORIDE 3 MILLILITER(S): 9 INJECTION, SOLUTION INTRAMUSCULAR; INTRAVENOUS; SUBCUTANEOUS at 23:38

## 2024-10-22 RX ADMIN — PANTOPRAZOLE SODIUM 40 MILLIGRAM(S): 40 TABLET, DELAYED RELEASE ORAL at 06:12

## 2024-10-22 RX ADMIN — Medication 0.6 MILLIGRAM(S): at 11:09

## 2024-10-22 RX ADMIN — POLYETHYLENE GLYCOL 3350 17 GRAM(S): 17 POWDER, FOR SOLUTION ORAL at 11:08

## 2024-10-22 RX ADMIN — APIXABAN 5 MILLIGRAM(S): 5 TABLET, FILM COATED ORAL at 06:13

## 2024-10-22 RX ADMIN — SODIUM CHLORIDE 3 MILLILITER(S): 9 INJECTION, SOLUTION INTRAMUSCULAR; INTRAVENOUS; SUBCUTANEOUS at 13:23

## 2024-10-22 RX ADMIN — Medication 80 MILLIGRAM(S): at 22:41

## 2024-10-22 RX ADMIN — CHLORHEXIDINE GLUCONATE 1 APPLICATION(S): 40 SOLUTION TOPICAL at 06:24

## 2024-10-22 RX ADMIN — Medication 18 UNIT(S): at 22:00

## 2024-10-22 NOTE — DIETITIAN INITIAL EVALUATION ADULT - ADD RECOMMEND
1. Continue with current diet as tolerated  2. Monitor PO intake, diet tolerance, weight trends, labs, and skin integrity and bowel movement regularity.   3. Encourage PO intake and honor food preferences as able unless otherwise contraindicated.   4. Provide ongoing education as needed.   5. RDN will continue to monitor, reassess, and intervene as appropriate.

## 2024-10-22 NOTE — PROGRESS NOTE ADULT - SUBJECTIVE AND OBJECTIVE BOX
Patient discussed on morning rounds with Dr. Patrick    Operation / Date:   S/p MIDCAB (LIMA-LAD), 40mm JOAQUIN clip (10/18/24)    SUBJECTIVE ASSESSMENT:  79y Male without complaints. Denies fever, chills, chest pain, sob, abd pain, n/v/d. Endocrine still following for DM management.     Vital Signs Last 24 Hrs  T(C): 36.7 (22 Oct 2024 13:45), Max: 36.9 (22 Oct 2024 01:01)  T(F): 98 (22 Oct 2024 13:45), Max: 98.4 (22 Oct 2024 01:01)  HR: 98 (22 Oct 2024 13:00) (88 - 99)  BP: 102/63 (22 Oct 2024 13:00) (95/50 - 132/68)  BP(mean): 78 (22 Oct 2024 13:00) (66 - 93)  RR: 17 (22 Oct 2024 13:00) (16 - 18)  SpO2: 98% (22 Oct 2024 13:00) (94% - 98%)    Parameters below as of 22 Oct 2024 13:00  Patient On (Oxygen Delivery Method): room air      I&O's Detail    21 Oct 2024 07:01  -  22 Oct 2024 07:00  --------------------------------------------------------  IN:  Total IN: 0 mL    OUT:    Chest Tube (mL): 30 mL    Voided (mL): 800 mL  Total OUT: 830 mL    Total NET: -830 mL    CHEST TUBE:  No.  GERARDO DRAIN:  No.  EPICARDIAL WIRES: No.  TIE DOWNS: Yes  CALHOUN: No.    PHYSICAL EXAM:  Appearance: No acute distress.  Neurologic: AAOx3, no AMS or focal deficits.  Responds appropriately to verbal and physical stimuli; exhibits purposeful movement in all extremities.  Cardiovascular: RRR, S1 S2. No m/r/g.  Respiratory: No acute respiratory distress. CTA b/l, no w/r/r.   Gastrointestinal:  Soft, non-tender, non-distended, + BS.	  Skin: No rashes. No ecchymoses. No cyanosis.  Extremities: Exhibits normal range of motion, no clubbing, cyanosis or edema.  Vascular: Peripheral pulses palpable 2+ bilaterally.  Incision Sites: +L chest incision c/d/i, L gerardo and L CT removal sites c/d/i with dressings in place.     LABS:                        10.0   9.51  )-----------( 211      ( 22 Oct 2024 05:30 )             30.1     10-22    136  |  104  |  24[H]  ----------------------------<  144[H]  4.4   |  25  |  0.84    Ca    7.9[L]      22 Oct 2024 05:30  Mg     2.4     10-22        Urinalysis Basic - ( 22 Oct 2024 05:30 )    Color: x / Appearance: x / SG: x / pH: x  Gluc: 144 mg/dL / Ketone: x  / Bili: x / Urobili: x   Blood: x / Protein: x / Nitrite: x   Leuk Esterase: x / RBC: x / WBC x   Sq Epi: x / Non Sq Epi: x / Bacteria: x        MEDICATIONS  (STANDING):  apixaban 5 milliGRAM(s) Oral every 12 hours  ascorbic acid 500 milliGRAM(s) Oral daily  aspirin enteric coated 81 milliGRAM(s) Oral daily  atorvastatin 80 milliGRAM(s) Oral at bedtime  bisacodyl Suppository 10 milliGRAM(s) Rectal once  chlorhexidine 2% Cloths 1 Application(s) Topical daily  colchicine 0.6 milliGRAM(s) Oral daily  dextrose 5%. 1000 milliLiter(s) (50 mL/Hr) IV Continuous <Continuous>  dextrose 5%. 1000 milliLiter(s) (100 mL/Hr) IV Continuous <Continuous>  dextrose 50% Injectable 25 Gram(s) IV Push once  dextrose 50% Injectable 25 Gram(s) IV Push once  dextrose 50% Injectable 12.5 Gram(s) IV Push once  furosemide    Tablet 20 milliGRAM(s) Oral daily  gabapentin 100 milliGRAM(s) Oral every 8 hours  glucagon  Injectable 1 milliGRAM(s) IntraMuscular once  insulin glargine Injectable (LANTUS) 18 Unit(s) SubCutaneous at bedtime  insulin lispro (ADMELOG) corrective regimen sliding scale   SubCutaneous Before meals and at bedtime  insulin lispro Injectable (ADMELOG) 14 Unit(s) SubCutaneous three times a day before meals  metoprolol tartrate 37.5 milliGRAM(s) Oral every 12 hours  mupirocin 2% Nasal 1 Application(s) Both Nostrils two times a day  pantoprazole    Tablet 40 milliGRAM(s) Oral before breakfast  polyethylene glycol 3350 17 Gram(s) Oral daily  senna 2 Tablet(s) Oral at bedtime  sodium chloride 0.9% lock flush 3 milliLiter(s) IV Push every 8 hours  sodium chloride 0.9%. 1000 milliLiter(s) (10 mL/Hr) IV Continuous <Continuous>  tamsulosin 0.4 milliGRAM(s) Oral at bedtime    MEDICATIONS  (PRN):  acetaminophen     Tablet .. 1000 milliGRAM(s) Oral every 6 hours PRN Mild Pain (1 - 3)  dextrose Oral Gel 15 Gram(s) Oral once PRN Blood Glucose LESS THAN 70 milliGRAM(s)/deciliter  oxyCODONE    IR 5 milliGRAM(s) Oral every 4 hours PRN Moderate Pain (4 - 6)        RADIOLOGY & ADDITIONAL TESTS:  < from: Xray Chest 1 View- PORTABLE-Routine (Xray Chest 1 View- PORTABLE-Routine in AM.) (10.22.24 @ 06:31) >  Left lung base focal atelectasis partially improving compared to prior   exam 10/21/2024. Otherwise similar appearance. No acute infiltrates   appearing. No pneumothorax. Postop change.    < end of copied text >

## 2024-10-22 NOTE — DIETITIAN INITIAL EVALUATION ADULT - EDUCATION DIETARY MODIFICATIONS
Discussed/Educated pt on the importance of consuming consistent carbohydrates throughout the day. Reviewed examples of carbohydrates on the menu. To pair carbohydrates with either a protein or fat during meal times. Pt receptive and verbalizes understanding./(2) meets goals/outcomes/verbalization

## 2024-10-22 NOTE — PROGRESS NOTE ADULT - SUBJECTIVE AND OBJECTIVE BOX
SUBJECTIVE / INTERVAL HPI: Patient was seen and examined this morning.Patient was seen and examined this morning. Utilized  # [id number]. HHA at bedside. Reports moderate pain. Appetite is not great. Mostly eating fruit and "natural" juice from home - strawberries, pineapple, and lemon.  Endocrine is consulted for type 2 diabetes management (chronic,  exacerbated).     CAPILLARY BLOOD GLUCOSE & INSULIN RECEIVED  246 mg/dL (10-20 @ 21:55) - Lantus 14 + lispro 4  204 mg/dL (10-21 @ 05:30)  200 mg/dL (10-21 @ 06:35) - Lispro 8+2  287 mg/dL (10-21 @ 11:22) - Lispro 14+6  162 mg/dL (10-21 @ 16:32) - Lispro 14+2  93 mg/dL (10-21 @ 21:36) - Lantus 18  144 mg/dL (10-22 @ 05:30)  136 mg/dL (10-22 @ 06:20)  148 mg/dL (10-22 @ 07:57) - Lispro 14  mg/dL (10-22 @ lunch)      REVIEW OF SYSTEMS  Constitutional:  Negative fever, chills or loss of appetite.  Eyes:  Negative blurry vision or double vision.  Cardiovascular:  Negative for chest pain or palpitations.  Respiratory:  Negative for cough, wheezing, or shortness of breath.    Gastrointestinal:  Negative for nausea, vomiting, diarrhea, constipation, or abdominal pain.  Genitourinary:  Negative frequency, urgency or dysuria.  Neurologic:  No headache, confusion, dizziness, lightheadedness.    PHYSICAL EXAM  Vital Signs Last 24 Hrs  T(C): 36.2 (22 Oct 2024 08:59), Max: 36.9 (22 Oct 2024 01:01)  T(F): 97.2 (22 Oct 2024 08:59), Max: 98.4 (22 Oct 2024 01:01)  HR: 88 (22 Oct 2024 06:12) (88 - 96)  BP: 95/50 (22 Oct 2024 06:12) (95/50 - 132/68)  BP(mean): 66 (22 Oct 2024 06:12) (66 - 93)  RR: 17 (22 Oct 2024 06:12) (16 - 17)  SpO2: 95% (22 Oct 2024 06:12) (94% - 98%)    Parameters below as of 22 Oct 2024 06:12  Patient On (Oxygen Delivery Method): room air    Constitutional: Awake, alert, in no acute distress.   HEENT: Normocephalic, atraumatic, ALVERTO.  Respiratory: Lungs clear to ausculation bilaterally.   Cardiovascular: regular rhythm, normal S1 and S2, no audible murmurs.   GI: soft, non-tender, non-distended, bowel sounds present.  Extremities: No lower extremity edema.  Psychiatric: AAO x 3. Normal affect/mood.     LABS  CBC - WBC/HGB/HTC/PLT: 9.51/10.0/30.1/211 (10-22-24)  BMP - Na/K/Cl/Bicarb/BUN/Cr/Gluc/AG/eGFR: 136/4.4/104/25/24/0.84/144/7/90 (10-22-24)  Ca - 7.9 (10-22-24)  Phos - -- (10-22-24)  Mg - 2.4 (10-22-24)  LFT - Alb/Tprot/Tbili/Dbili/AlkPhos/ALT/AST: 3.7/--/0.7/--/92/20/26 (10-19-24)  PT/aPTT/INR: 15.2/33.1/1.33 (10-19-24)   Thyroid Stimulating Hormone, Serum: 4.250 (10-17-24)      MEDICATIONS  MEDICATIONS  (STANDING):  apixaban 5 milliGRAM(s) Oral every 12 hours  ascorbic acid 500 milliGRAM(s) Oral daily  aspirin enteric coated 81 milliGRAM(s) Oral daily  atorvastatin 80 milliGRAM(s) Oral at bedtime  bisacodyl Suppository 10 milliGRAM(s) Rectal once  chlorhexidine 2% Cloths 1 Application(s) Topical daily  colchicine 0.6 milliGRAM(s) Oral daily  dextrose 5%. 1000 milliLiter(s) (100 mL/Hr) IV Continuous <Continuous>  dextrose 5%. 1000 milliLiter(s) (50 mL/Hr) IV Continuous <Continuous>  dextrose 50% Injectable 25 Gram(s) IV Push once  dextrose 50% Injectable 12.5 Gram(s) IV Push once  dextrose 50% Injectable 25 Gram(s) IV Push once  furosemide    Tablet 20 milliGRAM(s) Oral daily  gabapentin 100 milliGRAM(s) Oral every 8 hours  glucagon  Injectable 1 milliGRAM(s) IntraMuscular once  insulin glargine Injectable (LANTUS) 18 Unit(s) SubCutaneous at bedtime  insulin lispro (ADMELOG) corrective regimen sliding scale   SubCutaneous Before meals and at bedtime  insulin lispro Injectable (ADMELOG) 14 Unit(s) SubCutaneous three times a day before meals  metoprolol tartrate 37.5 milliGRAM(s) Oral every 12 hours  mupirocin 2% Nasal 1 Application(s) Both Nostrils two times a day  pantoprazole    Tablet 40 milliGRAM(s) Oral before breakfast  polyethylene glycol 3350 17 Gram(s) Oral daily  senna 2 Tablet(s) Oral at bedtime  sodium chloride 0.9% lock flush 3 milliLiter(s) IV Push every 8 hours  sodium chloride 0.9%. 1000 milliLiter(s) (10 mL/Hr) IV Continuous <Continuous>  tamsulosin 0.4 milliGRAM(s) Oral at bedtime    MEDICATIONS  (PRN):  acetaminophen     Tablet .. 1000 milliGRAM(s) Oral every 6 hours PRN Mild Pain (1 - 3)  dextrose Oral Gel 15 Gram(s) Oral once PRN Blood Glucose LESS THAN 70 milliGRAM(s)/deciliter  oxyCODONE    IR 5 milliGRAM(s) Oral every 4 hours PRN Moderate Pain (4 - 6)         SUBJECTIVE / INTERVAL HPI: Patient was seen and examined this morning. Patient was seen and examined this morning. Utilized  # [id number]. HHA at bedside. OOB to chair. Looks better. Is eating meals and drinking glucerna. However he is no eating consistent carbs at meals, so it is difficult to adjust premeal insulin. Ie, he had chicken and apple for dinner last night and eggs, potatoes, and ensure for breakfast, significantly more carb than at dinner.  Discussed diet again in detail with him and HHA. At home, he eats green banana and bread. Lunch - protein no starch. Dinner - Milk or cheese. He doesn't eat much carb at home because the glucose goes up. Explained that he will now be getting insulin before each meal so he needs to eat moderate amount of starch with each meals to balance the meal, as well as provide complete nutrition.  Endocrine is consulted for type 2 diabetes management (chronic,  exacerbated).     CAPILLARY BLOOD GLUCOSE & INSULIN RECEIVED  246 mg/dL (10-20 @ 21:55) - Lantus 14 + lispro 4  204 mg/dL (10-21 @ 05:30)  200 mg/dL (10-21 @ 06:35) - Lispro 8+2  287 mg/dL (10-21 @ 11:22) - Lispro 14+6  162 mg/dL (10-21 @ 16:32) - Lispro 14+2  93 mg/dL (10-21 @ 21:36) - Lantus 18  144 mg/dL (10-22 @ 05:30)  136 mg/dL (10-22 @ 06:20)  148 mg/dL (10-22 @ 07:57) - Lispro 14  mg/dL (10-22 @ lunch)      REVIEW OF SYSTEMS  Constitutional:  Negative fever, chills or loss of appetite.  Eyes:  Negative blurry vision or double vision.  Cardiovascular:  Negative for chest pain or palpitations.  Respiratory:  Negative for cough, wheezing, or shortness of breath.    Gastrointestinal:  Negative for nausea, vomiting, diarrhea, constipation, or abdominal pain.  Genitourinary:  Negative frequency, urgency or dysuria.  Neurologic:  No headache, confusion, dizziness, lightheadedness.    PHYSICAL EXAM  Vital Signs Last 24 Hrs  T(C): 36.2 (22 Oct 2024 08:59), Max: 36.9 (22 Oct 2024 01:01)  T(F): 97.2 (22 Oct 2024 08:59), Max: 98.4 (22 Oct 2024 01:01)  HR: 88 (22 Oct 2024 06:12) (88 - 96)  BP: 95/50 (22 Oct 2024 06:12) (95/50 - 132/68)  BP(mean): 66 (22 Oct 2024 06:12) (66 - 93)  RR: 17 (22 Oct 2024 06:12) (16 - 17)  SpO2: 95% (22 Oct 2024 06:12) (94% - 98%)    Parameters below as of 22 Oct 2024 06:12  Patient On (Oxygen Delivery Method): room air    Constitutional: Awake, alert, in no acute distress.   HEENT: Normocephalic, atraumatic, ALVERTO.  Respiratory: Lungs clear to ausculation bilaterally.   Cardiovascular: regular rhythm, normal S1 and S2, no audible murmurs.   GI: soft, non-tender, non-distended, bowel sounds present.  Extremities: No lower extremity edema.  Psychiatric: AAO x 3. Normal affect/mood.     LABS  CBC - WBC/HGB/HTC/PLT: 9.51/10.0/30.1/211 (10-22-24)  BMP - Na/K/Cl/Bicarb/BUN/Cr/Gluc/AG/eGFR: 136/4.4/104/25/24/0.84/144/7/90 (10-22-24)  Ca - 7.9 (10-22-24)  Phos - -- (10-22-24)  Mg - 2.4 (10-22-24)  LFT - Alb/Tprot/Tbili/Dbili/AlkPhos/ALT/AST: 3.7/--/0.7/--/92/20/26 (10-19-24)  PT/aPTT/INR: 15.2/33.1/1.33 (10-19-24)   Thyroid Stimulating Hormone, Serum: 4.250 (10-17-24)      MEDICATIONS  MEDICATIONS  (STANDING):  apixaban 5 milliGRAM(s) Oral every 12 hours  ascorbic acid 500 milliGRAM(s) Oral daily  aspirin enteric coated 81 milliGRAM(s) Oral daily  atorvastatin 80 milliGRAM(s) Oral at bedtime  bisacodyl Suppository 10 milliGRAM(s) Rectal once  chlorhexidine 2% Cloths 1 Application(s) Topical daily  colchicine 0.6 milliGRAM(s) Oral daily  dextrose 5%. 1000 milliLiter(s) (100 mL/Hr) IV Continuous <Continuous>  dextrose 5%. 1000 milliLiter(s) (50 mL/Hr) IV Continuous <Continuous>  dextrose 50% Injectable 25 Gram(s) IV Push once  dextrose 50% Injectable 12.5 Gram(s) IV Push once  dextrose 50% Injectable 25 Gram(s) IV Push once  furosemide    Tablet 20 milliGRAM(s) Oral daily  gabapentin 100 milliGRAM(s) Oral every 8 hours  glucagon  Injectable 1 milliGRAM(s) IntraMuscular once  insulin glargine Injectable (LANTUS) 18 Unit(s) SubCutaneous at bedtime  insulin lispro (ADMELOG) corrective regimen sliding scale   SubCutaneous Before meals and at bedtime  insulin lispro Injectable (ADMELOG) 14 Unit(s) SubCutaneous three times a day before meals  metoprolol tartrate 37.5 milliGRAM(s) Oral every 12 hours  mupirocin 2% Nasal 1 Application(s) Both Nostrils two times a day  pantoprazole    Tablet 40 milliGRAM(s) Oral before breakfast  polyethylene glycol 3350 17 Gram(s) Oral daily  senna 2 Tablet(s) Oral at bedtime  sodium chloride 0.9% lock flush 3 milliLiter(s) IV Push every 8 hours  sodium chloride 0.9%. 1000 milliLiter(s) (10 mL/Hr) IV Continuous <Continuous>  tamsulosin 0.4 milliGRAM(s) Oral at bedtime    MEDICATIONS  (PRN):  acetaminophen     Tablet .. 1000 milliGRAM(s) Oral every 6 hours PRN Mild Pain (1 - 3)  dextrose Oral Gel 15 Gram(s) Oral once PRN Blood Glucose LESS THAN 70 milliGRAM(s)/deciliter  oxyCODONE    IR 5 milliGRAM(s) Oral every 4 hours PRN Moderate Pain (4 - 6)         SUBJECTIVE / INTERVAL HPI: Patient was seen and examined this morning. Patient was seen and examined this morning. Utilized  # [id number]. HHA at bedside. OOB to chair. Looks better. Is eating meals and drinking glucerna. However he is no eating consistent carbs at meals, so it is difficult to adjust premeal insulin. Ie, he had chicken and apple for dinner last night and eggs, potatoes, and ensure for breakfast, significantly more carb than at dinner.  Discussed diet again in detail with him and HHA. At home, he eats green banana and bread. Lunch - protein no starch. Dinner - Milk or cheese. He doesn't eat much carb at home because the glucose goes up. Explained that he will now be getting insulin before each meal so he needs to eat moderate amount of starch with each meals to balance the meal, as well as provide complete nutrition.  Endocrine is consulted for type 2 diabetes management (chronic,  exacerbated).     CAPILLARY BLOOD GLUCOSE & INSULIN RECEIVED  246 mg/dL (10-20 @ 21:55) - Lantus 14 + lispro 4  204 mg/dL (10-21 @ 05:30)  200 mg/dL (10-21 @ 06:35) - Lispro 8+2  287 mg/dL (10-21 @ 11:22) - Lispro 14+6  162 mg/dL (10-21 @ 16:32) - Lispro 14+2  93 mg/dL (10-21 @ 21:36) - Lantus 18  144 mg/dL (10-22 @ 05:30)  136 mg/dL (10-22 @ 06:20)  148 mg/dL (10-22 @ 07:57) - Lispro 14  235 mg/dL (10-22 @ lunch)      REVIEW OF SYSTEMS  Constitutional:  Negative fever, chills or loss of appetite.  Eyes:  Negative blurry vision or double vision.  Cardiovascular:  Negative for chest pain or palpitations.  Respiratory:  Negative for cough, wheezing, or shortness of breath.    Gastrointestinal:  Negative for nausea, vomiting, diarrhea, constipation, or abdominal pain.  Genitourinary:  Negative frequency, urgency or dysuria.  Neurologic:  No headache, confusion, dizziness, lightheadedness.    PHYSICAL EXAM  Vital Signs Last 24 Hrs  T(C): 36.2 (22 Oct 2024 08:59), Max: 36.9 (22 Oct 2024 01:01)  T(F): 97.2 (22 Oct 2024 08:59), Max: 98.4 (22 Oct 2024 01:01)  HR: 88 (22 Oct 2024 06:12) (88 - 96)  BP: 95/50 (22 Oct 2024 06:12) (95/50 - 132/68)  BP(mean): 66 (22 Oct 2024 06:12) (66 - 93)  RR: 17 (22 Oct 2024 06:12) (16 - 17)  SpO2: 95% (22 Oct 2024 06:12) (94% - 98%)    Parameters below as of 22 Oct 2024 06:12  Patient On (Oxygen Delivery Method): room air    Constitutional: Awake, alert, in no acute distress.   HEENT: Normocephalic, atraumatic, ALVERTO.  Respiratory: Lungs clear to ausculation bilaterally.   Cardiovascular: regular rhythm, normal S1 and S2, no audible murmurs.   GI: soft, non-tender, non-distended, bowel sounds present.  Extremities: No lower extremity edema.  Psychiatric: AAO x 3. Normal affect/mood.     LABS  CBC - WBC/HGB/HTC/PLT: 9.51/10.0/30.1/211 (10-22-24)  BMP - Na/K/Cl/Bicarb/BUN/Cr/Gluc/AG/eGFR: 136/4.4/104/25/24/0.84/144/7/90 (10-22-24)  Ca - 7.9 (10-22-24)  Phos - -- (10-22-24)  Mg - 2.4 (10-22-24)  LFT - Alb/Tprot/Tbili/Dbili/AlkPhos/ALT/AST: 3.7/--/0.7/--/92/20/26 (10-19-24)  PT/aPTT/INR: 15.2/33.1/1.33 (10-19-24)   Thyroid Stimulating Hormone, Serum: 4.250 (10-17-24)      MEDICATIONS  MEDICATIONS  (STANDING):  apixaban 5 milliGRAM(s) Oral every 12 hours  ascorbic acid 500 milliGRAM(s) Oral daily  aspirin enteric coated 81 milliGRAM(s) Oral daily  atorvastatin 80 milliGRAM(s) Oral at bedtime  bisacodyl Suppository 10 milliGRAM(s) Rectal once  chlorhexidine 2% Cloths 1 Application(s) Topical daily  colchicine 0.6 milliGRAM(s) Oral daily  dextrose 5%. 1000 milliLiter(s) (100 mL/Hr) IV Continuous <Continuous>  dextrose 5%. 1000 milliLiter(s) (50 mL/Hr) IV Continuous <Continuous>  dextrose 50% Injectable 25 Gram(s) IV Push once  dextrose 50% Injectable 12.5 Gram(s) IV Push once  dextrose 50% Injectable 25 Gram(s) IV Push once  furosemide    Tablet 20 milliGRAM(s) Oral daily  gabapentin 100 milliGRAM(s) Oral every 8 hours  glucagon  Injectable 1 milliGRAM(s) IntraMuscular once  insulin glargine Injectable (LANTUS) 18 Unit(s) SubCutaneous at bedtime  insulin lispro (ADMELOG) corrective regimen sliding scale   SubCutaneous Before meals and at bedtime  insulin lispro Injectable (ADMELOG) 14 Unit(s) SubCutaneous three times a day before meals  metoprolol tartrate 37.5 milliGRAM(s) Oral every 12 hours  mupirocin 2% Nasal 1 Application(s) Both Nostrils two times a day  pantoprazole    Tablet 40 milliGRAM(s) Oral before breakfast  polyethylene glycol 3350 17 Gram(s) Oral daily  senna 2 Tablet(s) Oral at bedtime  sodium chloride 0.9% lock flush 3 milliLiter(s) IV Push every 8 hours  sodium chloride 0.9%. 1000 milliLiter(s) (10 mL/Hr) IV Continuous <Continuous>  tamsulosin 0.4 milliGRAM(s) Oral at bedtime    MEDICATIONS  (PRN):  acetaminophen     Tablet .. 1000 milliGRAM(s) Oral every 6 hours PRN Mild Pain (1 - 3)  dextrose Oral Gel 15 Gram(s) Oral once PRN Blood Glucose LESS THAN 70 milliGRAM(s)/deciliter  oxyCODONE    IR 5 milliGRAM(s) Oral every 4 hours PRN Moderate Pain (4 - 6)

## 2024-10-22 NOTE — DIETITIAN INITIAL EVALUATION ADULT - PERTINENT MEDS FT
MEDICATIONS  (STANDING):  apixaban 5 milliGRAM(s) Oral every 12 hours  ascorbic acid 500 milliGRAM(s) Oral daily  aspirin enteric coated 81 milliGRAM(s) Oral daily  atorvastatin 80 milliGRAM(s) Oral at bedtime  bisacodyl Suppository 10 milliGRAM(s) Rectal once  chlorhexidine 2% Cloths 1 Application(s) Topical daily  colchicine 0.6 milliGRAM(s) Oral daily  dextrose 5%. 1000 milliLiter(s) (50 mL/Hr) IV Continuous <Continuous>  dextrose 5%. 1000 milliLiter(s) (100 mL/Hr) IV Continuous <Continuous>  dextrose 50% Injectable 25 Gram(s) IV Push once  dextrose 50% Injectable 25 Gram(s) IV Push once  dextrose 50% Injectable 12.5 Gram(s) IV Push once  furosemide    Tablet 20 milliGRAM(s) Oral daily  gabapentin 100 milliGRAM(s) Oral every 8 hours  glucagon  Injectable 1 milliGRAM(s) IntraMuscular once  insulin glargine Injectable (LANTUS) 18 Unit(s) SubCutaneous at bedtime  insulin lispro (ADMELOG) corrective regimen sliding scale   SubCutaneous Before meals and at bedtime  insulin lispro Injectable (ADMELOG) 14 Unit(s) SubCutaneous three times a day before meals  metoprolol tartrate 37.5 milliGRAM(s) Oral every 12 hours  mupirocin 2% Nasal 1 Application(s) Both Nostrils two times a day  pantoprazole    Tablet 40 milliGRAM(s) Oral before breakfast  polyethylene glycol 3350 17 Gram(s) Oral daily  senna 2 Tablet(s) Oral at bedtime  sodium chloride 0.9% lock flush 3 milliLiter(s) IV Push every 8 hours  sodium chloride 0.9%. 1000 milliLiter(s) (10 mL/Hr) IV Continuous <Continuous>  tamsulosin 0.4 milliGRAM(s) Oral at bedtime    MEDICATIONS  (PRN):  acetaminophen     Tablet .. 1000 milliGRAM(s) Oral every 6 hours PRN Mild Pain (1 - 3)  dextrose Oral Gel 15 Gram(s) Oral once PRN Blood Glucose LESS THAN 70 milliGRAM(s)/deciliter  oxyCODONE    IR 5 milliGRAM(s) Oral every 4 hours PRN Moderate Pain (4 - 6)

## 2024-10-22 NOTE — DIETITIAN INITIAL EVALUATION ADULT - NS FNS DIET ORDER
Diet, Consistent Carbohydrate/No Snacks:   DASH/TLC {Sodium & Cholesterol Restricted} (DASH)  Supplement Feeding Modality:  Oral  Ensure Max Cans or Servings Per Day:  1       Frequency:  Three Times a day (10-22-24 @ 12:00)

## 2024-10-22 NOTE — DIETITIAN INITIAL EVALUATION ADULT - NSFNSGIIOFT_GEN_A_CORE
10-21-24 @ 07:01  -  10-22-24 @ 07:00  --------------------------------------------------------  OUT:    Chest Tube (mL): 30 mL  Total OUT: 30 mL    Total NET: -30 mL

## 2024-10-22 NOTE — DIETITIAN INITIAL EVALUATION ADULT - OTHER INFO
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 Patient presented to [facility] on 9/30/24 with new onset chest pain at rest. In the ED, he was ruled in for NSTEMI. 10/1/24 TTE revealed LVEF 60%, dilated ascending aorta, no significant valvular disease. 10/1/24 s/p Cardiac cath that revealed patent stent in OM1, 80-90% mid-distal LAD stenosis. Patient medically managed and discharged home on 10/2. Pt presented on [facility] on 10/18 and undersent MIDCAB, JOAQUIN occlusion with Dr. Patrick. Arrived to ICU extubated on no drips. POD 1 started on BB, endocrine consulted for labile blood sugars with A1c of 9. Transferred to 9La. Tubes kept for high output. POD 2 insulin redosed. Plan to start on ASA/eliquis for afib once tubes are out. POD 3, pt with L sided c/p, likely related to pericarditis."     Visited pt at bedside on 9LA. Turkmen speaking. Used translation services # [id number]. States that at home he has a good appetite, Endorses following a "low sugar" diet at home. Confirms No known food allergies. No cultural, ethnic, Yarsanism food preferences noted. Pt reports no vitamin/mineral supplementation at home. Reports no additional use of oral nutritional supplement. No difficulties chewing or swallowing reported per pt at time of visit. Current diet: Consistent Carbohydrate w/ No Evening Snack + DASH/TLC. Endorses good appetite while inpatient, consuming ~75% of meals. Dosing weight 10/18 143 pounds, UBW: ~146 pounds, IBW: 142 pounds %IBW: 100%. Observed with no overt signs and symptoms of muscle or fat wasting. Based on ASPEN guidelines, pt does not meet criteria for malnutrition. 0/10 pain per flow sheets. No Pressure Injuries per flow sheets. Dusty Score: 20; +1 generalized edema per flow sheets. Meds reviewed. Ordered for .9% NaCl IV, 18U Lantus, 14 U premeal insulin, Insulin Sliding Scale, POCT ranges  past 24 hours; lasix, dulcolax, miralax, senna, vitamin c. Nutritionally Pertinent labs 10/24 BUN: 24 (H), Gluc: 144 (H). See Nutrition recommendations below.

## 2024-10-22 NOTE — DIETITIAN INITIAL EVALUATION ADULT - OTHER CALCULATIONS
Actual body weight (64.8kg) used to calculate energy needs due to pt's current body weight within % (100%) ideal body weight. Needs adjusted for clinical course.

## 2024-10-22 NOTE — PROGRESS NOTE ADULT - PROBLEM SELECTOR PLAN 1
Type 2 diabetes mellitus with hyperglycemia  - Please increase lantus to   units at bedtime.   - Increase lispro to units before each meal.  - Continue lispro moderate dose sliding scale before meals and at bedtime.  - Patient's fingerstick glucose goal is 100-180 mg/dL.    - For discharge: Basal, dose TBD + premeal, dose TBD. Continue invokana. Stop glipizide. Increased glucose monitoring, 4x/day before meals and at bedtime.  - Patient can follow up at discharge with [facility] by calling [phone] to make an appointment.      Case discussed with Dr. Oseguera. Primary team updated. Type 2 diabetes mellitus with hyperglycemia  - Please continue lantus  18 units at bedtime.   - Continue lispro 14  units before each meal.  - Continue lispro moderate dose sliding scale before meals and at bedtime.  - Patient's fingerstick glucose goal is 100-180 mg/dL.    - For discharge: Basal, dose TBD + premeal, dose TBD. Continue invokana. Stop glipizide. Increased glucose monitoring, 4x/day before meals and at bedtime.  - Patient can follow up at discharge with [facility] by calling [phone] to make an appointment.      Case discussed with Dr. Oseguera. Primary team updated.

## 2024-10-22 NOTE — PROGRESS NOTE ADULT - ASSESSMENT
80 YO [place] citizen-speaking Male with PMHx of HTN, HLD, DMII (A1C 11.7%), pAF (on Eliquis), HFpEF, CKD-stage II, MI s/p PCI->OM1 in 2019 and NSTEMI 1/17/24 s/p Cardiac cath w/mid LAD stenosis s/p PTCA c/b dissection, now s/p recent NSTEMI w/ severe LAD stenosis. Patient presented to [facility] on 9/30/24 with new onset chest pain at rest. In the ED, he was ruled in for NSTEMI. 10/1/24 TTE revealed LVEF 60%, dilated ascending aorta, no significant valvular disease. 10/1/24 s/p Cardiac cath that revealed patent stent in OM1, 80-90% mid-distal LAD stenosis. Patient medically managed and discharged home on 10/2. Pt presented on [facility] on 10/18 and undersent MIDCAB, JOAQUIN occlusion with Dr. Patrick. Arrived to ICU extubated on no drips. POD 1 started on BB, endocrine consulted for labile blood sugars with A1c of 9. Transferred to 9La. Tubes kept for high output. POD 2 insulin redosed. Plan to start on ASA/eliquis for afib once tubes are out. POD 3, pt with L sided c/p, likely related to pericarditis. No medrol dose pack given a1c of 9 and uncontrolled sugars. Pending endo reccs. High output from pleural tube  yesterday, so deferring removal for the morning. Likely DC tomorrow.     A/P:  Neurovascular: No delirium. Pain well controlled with current regimen.  -PRN's.    Cardiovascular: Hemodynamically stable. HR controlled.  -BP. HR. EKG. TTE. Cardiac Panel. Lipid Panel. BNP.   -ASA. Plavix. BBlocker. Statin.      Respiratory: 02 Sat = 98% on RA.  -If on oxygen wean to RA from for O2 Sat > 93%.  -Encourage C+DB and Use of IS 10x / hr while awake.  -CXR.    GI: Stable.  -NPO after MN.  -PPX.  -PO Diet.    Renal / :  -Monitor renal function.  -Monitor I/O's.    Endocrine:    -T2DM (A1c: 9.1%): endocrine following, titrating insulin recs     -TSH: 4.250,     Hematologic:  -CBC: rbc 3.54, Hgb 10, Plt 211   -Coagulation Panel: (10/19/24) PTT 33.1, PT 15.2, INR 1.33    ID:  -Tempature: afebrile   -CBC: WBC 9.51  -Observe for SIRS/Sepsis Syndrome.    Prophylaxis:  -DVT prophylaxis with 5000 SubQ Heparin q8h.  -SCD's    Disposition:  -Telemetry, pending official discharge recs per Endocrine, will solidify by tomorrow AM for discharge.    78 YO Cypriot-speaking Male with PMHx of HTN, HLD, DMII (A1C 11.7%), pAF (on Eliquis), HFpEF, CKD-stage II, MI s/p PCI->OM1 in 2019 and NSTEMI 1/17/24 s/p Cardiac cath w/mid LAD stenosis s/p PTCA c/b dissection, now s/p recent NSTEMI w/ severe LAD stenosis. Patient presented to [facility] on 9/30/24 with new onset chest pain at rest. In the ED, he was ruled in for NSTEMI. 10/1/24 TTE revealed LVEF 60%, dilated ascending aorta, no significant valvular disease. 10/1/24 s/p Cardiac cath that revealed patent stent in OM1, 80-90% mid-distal LAD stenosis. Patient medically managed and discharged home on 10/2. Pt presented on [facility] on 10/18 and undersent MIDCAB, JOAQUIN occlusion with Dr. Patrick. Arrived to ICU extubated on no drips. POD1, started on BB, endocrine consulted for labile blood sugars with A1c of 9. Transferred to 9La. Tubes kept for high output. POD2, insulin redosed. Plan to start on ASA/eliquis for afib once tubes are out. POD3, pt with L sided c/p, likely related to pericarditis, diffuse ST elevations on EKG, not started on medrol dose pack given A1c of 9%, pleural tube removed and started on ASA/ Eliquis. POD4, patient with soft BPs, endocrine requesting to monitor patient one more day, now that he is taking full meals. Per Endocrine will keep him on same insulin dosing today and likely to be discharged in AM.     A/P:  Neurovascular: No delirium. Pain well controlled with current regimen.  -Pain: tylenol prn, oxy prn      Cardiovascular: Hemodynamically stable. HR controlled.  -HLD:  c/w Lipitor 80mg daily   -pAFib: c/w Eliquis 5mg BID   -CAD, s/p MIDCAB (LIMA-LAD) w/ JOAQUIN clip   C/w ASA daily, c/w Lasix 20mg daily, c/w Lopressor 37.5mg BID, c/w ERP  -Post op pericarditis: c/w colchicine 0.6mg daily     Respiratory: 02 Sat = 98% on RA.  -Encourage C+DB and Use of IS 10x / hr while awake.  -CXR: no infiltrates, no PTX    GI: Stable.  -PPX: c/w pantoprazole 40mg daily   -PO Diet.  -Bowel Regimen: c/w miralax, senna     Renal / :  -Monitor renal function: BUN 24, Cr .84  -Monitor I/O's.  -BPH: c/w Tamsulosin .4mg daily     Endocrine:    -T2DM (A1c: 9.1%): endocrine following, titrating insulin recs   C/w Lantus 18 units at bedtime, c/w Lispro 14 units with meals,   -TSH: 4.250    Hematologic:  -CBC: rbc 3.54, Hgb 10, Plt 211   -Coagulation Panel: (10/19/24) PTT 33.1, PT 15.2, INR 1.33    ID:  -Tempature: afebrile   -CBC: WBC 9.51  -Observe for SIRS/Sepsis Syndrome.    Prophylaxis:  -DVT prophylaxis with Eliquis, ASA  -SCD's    Disposition:  -Telemetry, pending official discharge recs per Endocrine, will solidify by tomorrow AM for discharge.

## 2024-10-22 NOTE — PROGRESS NOTE ADULT - ASSESSMENT
This is a 79 year old man with PMH of type 2 DM (A1c 11.7%, was on Lantus 30u at home, invokana, glipizide), pAF on AC, CAD s/p prior PCI, HTN, HLD admitted for NSTEMI, s/p MIDCAB 10/18/24. In addition, TSH 4.25 noted - repeat as P{    Endocrine is consulted for type 2 diabetes management (chronic,  exacerbated).    Had extensive discussion about impact of fruit and juice on glucose, and it's low nutritional yield Advised eating whole fruits instead of juice, no more than 3 serving spread out throughout day. Educated on low glycemic fruits. Since his appetite is low here, he is amenable to try Glucerna instead of juice to better support nutrition needs and surgical site healing.    24 hour glucose data reviewed.  Insulin adjustment    A1C: 9.1 % - Above target.  C-peptide added on this AM.  BUN: 24  Creatinine: 0.84 - normal  GFR: 90  Weight: 65.3  BMI: 23.2 - normal  EF: 60% preop - normal This is a 79 year old man with PMH of type 2 DM (A1c 11.7%, was on Lantus 30u at home, invokana, glipizide), pAF on AC, CAD s/p prior PCI, HTN, HLD admitted for NSTEMI, s/p MIDCAB 10/18/24. In addition, TSH 4.25 noted - repeat as P{    Endocrine is consulted for type 2 diabetes management (chronic,  exacerbated).    Continued diet education specifically consistent carb diet to balance insulin dose.    24 hour glucose data reviewed.  Insulin adjustment    A1C: 9.1 % - Above target.  C-peptide added on this AM.  BUN: 24  Creatinine: 0.84 - normal  GFR: 90  Weight: 65.3  BMI: 23.2 - normal  EF: 60% preop - normal

## 2024-10-22 NOTE — DIETITIAN INITIAL EVALUATION ADULT - PERTINENT LABORATORY DATA
10-22    136  |  104  |  24[H]  ----------------------------<  144[H]  4.4   |  25  |  0.84    Ca    7.9[L]      22 Oct 2024 05:30  Mg     2.4     10-22    POCT Blood Glucose.: 235 mg/dL (10-22-24 @ 10:55)  A1C with Estimated Average Glucose Result: 9.1 % (10-17-24 @ 18:08)

## 2024-10-23 ENCOUNTER — NON-APPOINTMENT (OUTPATIENT)
Age: 79
End: 2024-10-23

## 2024-10-23 ENCOUNTER — TRANSCRIPTION ENCOUNTER (OUTPATIENT)
Age: 79
End: 2024-10-23

## 2024-10-23 VITALS — TEMPERATURE: 98 F

## 2024-10-23 LAB
ALBUMIN SERPL ELPH-MCNC: 3.2 G/DL — LOW (ref 3.3–5)
ALP SERPL-CCNC: 81 U/L — SIGNIFICANT CHANGE UP (ref 40–120)
ALT FLD-CCNC: 23 U/L — SIGNIFICANT CHANGE UP (ref 10–45)
ANION GAP SERPL CALC-SCNC: 10 MMOL/L — SIGNIFICANT CHANGE UP (ref 5–17)
APTT BLD: 31.3 SEC — SIGNIFICANT CHANGE UP (ref 24.5–35.6)
AST SERPL-CCNC: 26 U/L — SIGNIFICANT CHANGE UP (ref 10–40)
BILIRUB SERPL-MCNC: 0.5 MG/DL — SIGNIFICANT CHANGE UP (ref 0.2–1.2)
BUN SERPL-MCNC: 26 MG/DL — HIGH (ref 7–23)
CALCIUM SERPL-MCNC: 8.1 MG/DL — LOW (ref 8.4–10.5)
CHLORIDE SERPL-SCNC: 102 MMOL/L — SIGNIFICANT CHANGE UP (ref 96–108)
CO2 SERPL-SCNC: 26 MMOL/L — SIGNIFICANT CHANGE UP (ref 22–31)
CREAT SERPL-MCNC: 0.89 MG/DL — SIGNIFICANT CHANGE UP (ref 0.5–1.3)
EGFR: 88 ML/MIN/1.73M2 — SIGNIFICANT CHANGE UP
GLUCOSE BLDC GLUCOMTR-MCNC: 154 MG/DL — HIGH (ref 70–99)
GLUCOSE SERPL-MCNC: 150 MG/DL — HIGH (ref 70–99)
HCT VFR BLD CALC: 30.4 % — LOW (ref 39–50)
HGB BLD-MCNC: 9.8 G/DL — LOW (ref 13–17)
INR BLD: 1.02 — SIGNIFICANT CHANGE UP (ref 0.85–1.16)
MAGNESIUM SERPL-MCNC: 2.3 MG/DL — SIGNIFICANT CHANGE UP (ref 1.6–2.6)
MCHC RBC-ENTMCNC: 27.1 PG — SIGNIFICANT CHANGE UP (ref 27–34)
MCHC RBC-ENTMCNC: 32.2 GM/DL — SIGNIFICANT CHANGE UP (ref 32–36)
MCV RBC AUTO: 84 FL — SIGNIFICANT CHANGE UP (ref 80–100)
NRBC # BLD: 0 /100 WBCS — SIGNIFICANT CHANGE UP (ref 0–0)
PLATELET # BLD AUTO: 251 K/UL — SIGNIFICANT CHANGE UP (ref 150–400)
POTASSIUM SERPL-MCNC: 4.2 MMOL/L — SIGNIFICANT CHANGE UP (ref 3.5–5.3)
POTASSIUM SERPL-SCNC: 4.2 MMOL/L — SIGNIFICANT CHANGE UP (ref 3.5–5.3)
PROT SERPL-MCNC: 6.5 G/DL — SIGNIFICANT CHANGE UP (ref 6–8.3)
PROTHROM AB SERPL-ACNC: 11.9 SEC — SIGNIFICANT CHANGE UP (ref 9.9–13.4)
RBC # BLD: 3.62 M/UL — LOW (ref 4.2–5.8)
RBC # FLD: 13.2 % — SIGNIFICANT CHANGE UP (ref 10.3–14.5)
SODIUM SERPL-SCNC: 138 MMOL/L — SIGNIFICANT CHANGE UP (ref 135–145)
WBC # BLD: 9.13 K/UL — SIGNIFICANT CHANGE UP (ref 3.8–10.5)
WBC # FLD AUTO: 9.13 K/UL — SIGNIFICANT CHANGE UP (ref 3.8–10.5)

## 2024-10-23 PROCEDURE — 71045 X-RAY EXAM CHEST 1 VIEW: CPT | Mod: 26

## 2024-10-23 PROCEDURE — 99232 SBSQ HOSP IP/OBS MODERATE 35: CPT

## 2024-10-23 RX ORDER — GLIPIZIDE 5 MG
1 TABLET ORAL
Refills: 0 | DISCHARGE

## 2024-10-23 RX ORDER — INSULIN GLARGINE,HUM.REC.ANLOG 100/ML
40 VIAL (ML) SUBCUTANEOUS
Refills: 0 | DISCHARGE

## 2024-10-23 RX ORDER — OXYCODONE HYDROCHLORIDE 30 MG/1
1 TABLET ORAL
Qty: 18 | Refills: 0
Start: 2024-10-23 | End: 2024-10-25

## 2024-10-23 RX ORDER — CARVEDILOL 25 MG/1
1 TABLET, FILM COATED ORAL
Refills: 0 | DISCHARGE

## 2024-10-23 RX ORDER — INSULIN GLARGINE,HUM.REC.ANLOG 100/ML
20 VIAL (ML) SUBCUTANEOUS
Qty: 1 | Refills: 0
Start: 2024-10-23 | End: 2024-11-21

## 2024-10-23 RX ORDER — PANTOPRAZOLE SODIUM 40 MG/1
1 TABLET, DELAYED RELEASE ORAL
Qty: 30 | Refills: 0
Start: 2024-10-23 | End: 2024-11-21

## 2024-10-23 RX ORDER — FUROSEMIDE 40 MG
1 TABLET ORAL
Refills: 0 | DISCHARGE

## 2024-10-23 RX ORDER — POTASSIUM CHLORIDE 10 MEQ
1 TABLET, EXTENDED RELEASE ORAL
Qty: 7 | Refills: 0
Start: 2024-10-23 | End: 2024-10-29

## 2024-10-23 RX ORDER — ISOSORBIDE MONONITRATE 60 MG/1
1 TABLET, EXTENDED RELEASE ORAL
Refills: 0 | DISCHARGE

## 2024-10-23 RX ORDER — CANAGLIFLOZIN 100 MG/1
1 TABLET, FILM COATED ORAL
Refills: 0 | DISCHARGE

## 2024-10-23 RX ORDER — CANAGLIFLOZIN 100 MG/1
1 TABLET, FILM COATED ORAL
Qty: 30 | Refills: 0
Start: 2024-10-23 | End: 2024-11-21

## 2024-10-23 RX ORDER — REPAGLINIDE 2 MG/1
1 TABLET ORAL
Refills: 0 | DISCHARGE

## 2024-10-23 RX ORDER — ISOPROPYL ALCOHOL 0.7 ML/ML
1 SWAB TOPICAL
Qty: 100 | Refills: 0
Start: 2024-10-23 | End: 2024-11-21

## 2024-10-23 RX ORDER — METOPROLOL TARTRATE 50 MG
1 TABLET ORAL
Qty: 60 | Refills: 0
Start: 2024-10-23 | End: 2024-11-21

## 2024-10-23 RX ORDER — AMLODIPINE BESYLATE 10 MG
1 TABLET ORAL
Refills: 0 | DISCHARGE

## 2024-10-23 RX ORDER — LOSARTAN POTASSIUM 25 MG/1
1 TABLET ORAL
Refills: 0 | DISCHARGE

## 2024-10-23 RX ORDER — POLYETHYLENE GLYCOL 3350 17 G/17G
17 POWDER, FOR SOLUTION ORAL
Qty: 170 | Refills: 0
Start: 2024-10-23 | End: 2024-11-01

## 2024-10-23 RX ORDER — APIXABAN 5 MG/1
1 TABLET, FILM COATED ORAL
Qty: 60 | Refills: 0
Start: 2024-10-23 | End: 2024-11-21

## 2024-10-23 RX ORDER — APIXABAN 5 MG/1
1 TABLET, FILM COATED ORAL
Refills: 0 | DISCHARGE

## 2024-10-23 RX ORDER — ASPIRIN/MAG CARB/ALUMINUM AMIN 325 MG
1 TABLET ORAL
Qty: 30 | Refills: 0
Start: 2024-10-23 | End: 2024-11-21

## 2024-10-23 RX ORDER — FUROSEMIDE 40 MG
1 TABLET ORAL
Qty: 7 | Refills: 0
Start: 2024-10-23 | End: 2024-10-29

## 2024-10-23 RX ORDER — INSULIN LISPRO 100/ML
14 VIAL (ML) SUBCUTANEOUS
Qty: 1 | Refills: 0
Start: 2024-10-23 | End: 2024-11-21

## 2024-10-23 RX ORDER — ACETAMINOPHEN 500 MG
2 TABLET ORAL
Qty: 60 | Refills: 0
Start: 2024-10-23

## 2024-10-23 RX ADMIN — Medication 20 MILLIGRAM(S): at 05:57

## 2024-10-23 RX ADMIN — APIXABAN 5 MILLIGRAM(S): 5 TABLET, FILM COATED ORAL at 05:57

## 2024-10-23 RX ADMIN — MUPIROCIN 1 APPLICATION(S): 20 OINTMENT TOPICAL at 06:51

## 2024-10-23 RX ADMIN — Medication 14 UNIT(S): at 07:30

## 2024-10-23 RX ADMIN — OXYCODONE HYDROCHLORIDE 5 MILLIGRAM(S): 30 TABLET ORAL at 00:11

## 2024-10-23 RX ADMIN — Medication 2: at 07:30

## 2024-10-23 RX ADMIN — CHLORHEXIDINE GLUCONATE 1 APPLICATION(S): 40 SOLUTION TOPICAL at 06:50

## 2024-10-23 RX ADMIN — SODIUM CHLORIDE 3 MILLILITER(S): 9 INJECTION, SOLUTION INTRAMUSCULAR; INTRAVENOUS; SUBCUTANEOUS at 06:51

## 2024-10-23 RX ADMIN — Medication 37.5 MILLIGRAM(S): at 05:57

## 2024-10-23 RX ADMIN — PANTOPRAZOLE SODIUM 40 MILLIGRAM(S): 40 TABLET, DELAYED RELEASE ORAL at 06:51

## 2024-10-23 RX ADMIN — GABAPENTIN 100 MILLIGRAM(S): 300 CAPSULE ORAL at 05:56

## 2024-10-23 RX ADMIN — OXYCODONE HYDROCHLORIDE 5 MILLIGRAM(S): 30 TABLET ORAL at 00:41

## 2024-10-23 NOTE — DISCHARGE NOTE PROVIDER - NSDCCPTREATMENT_GEN_ALL_CORE_FT
PRINCIPAL PROCEDURE  Procedure: Minimally invasive direct coronary artery bypass (MIDCAB) with transesophageal echocardiography (ELBERT)  Findings and Treatment: LIMA to LAD

## 2024-10-23 NOTE — DISCHARGE NOTE PROVIDER - HOSPITAL COURSE
Patient discussed on morning rounds with Dr. Patrick  Operation Date: 10/18 MIDCAB, JOAQUIN clip  Primary Surgeon/Attending MD: Darnell  Referring Physician: Dr. Hua  _ _ _ _ _ _ _ _ _ _ _ _   HOSPITAL COURSE:     78 YO Serbian-speaking Male with PMHx of HTN, HLD, DMII (A1C 11.7%), pAF (on Eliquis), HFpEF, CKD-stage II, MI s/p PCI->OM1 in 2019 and NSTEMI 1/17/24 s/p Cardiac cath w/mid LAD stenosis s/p PTCA c/b dissection, now s/p recent NSTEMI w/ severe LAD stenosis. Patient presented to [facility] on 9/30/24 with new onset chest pain at rest. In the ED, he was ruled in for NSTEMI. 10/1/24 TTE revealed LVEF 60%, dilated ascending aorta, no significant valvular disease. 10/1/24 s/p Cardiac cath that revealed patent stent in OM1, 80-90% mid-distal LAD stenosis. Patient medically managed and discharged home on 10/2. Pt presented on [facility] on 10/18 and undersent MIDCAB, JOAQUIN occlusion with Dr. Patrick. Arrived to ICU extubated on no drips. POD1, started on BB, endocrine consulted for labile blood sugars with A1c of 9. Transferred to 9La. Tubes kept for high output. POD2, insulin redosed. POD3, pt with L sided c/p, likely related to pericarditis, diffuse ST elevations on EKG, not started on medrol dose pack given A1c of 9, pleural tube removed and started on ASA/ Eliquis. POD4, patient with soft BPs, endocrine requesting to monitor patient one more day, now that he is taking full meals. POD 5 Pt feeling well **INCOMPLETE NOTE**    _ _ _ _ _ _ _ _ _ _ _ _     DISCHARGE PHYSICAL EXAM:     PHYSICAL EXAM:  General:   Neurological: AOx3. Motor skills grossly intact  Cardiovascular: Normal S1/S2. Regular rate/rhythm. No murmurs  Respiratory: Lungs CTA bilaterally. No wheezing or rales  Gastrointestinal: +BS in all 4 quadrants. Non-distended. Soft. Non-tender  Extremities: Strength 5/5 b/l upper/lower extremities. Sensation grossly intact upper/lower extremities. No edema. No calf tenderness.  Vascular: Radial 2+bilaterally, DP 2+ b/l  Incision Sites:    _ _ _ _ _ _ _ _ _ _ _ _   REMOVAL CHECKLIST:         [ ] Epicardial wires         [ ] Stitches/tie downs,   If no, why?          [ ] PICC/Midline,   If no, why?    _ _ _ _ _ _ _ _ _ _ _ _   MEDICATION DISCHARGE CHECKLIST     CABG         [ ] Aspirin, [  ] Contraindicated, Reason:         [ ] Plavix, [  ] Contraindicated, Reason:         [ ] Statin, [  ] Contraindicated, Reason:         [ ] Lasix , [  ] Contraindicated, Reason:              Duration:          [ ] Beta-Blocker, [  ] Contraindicated, Reason:     MIDCAB/PCI         [ ] Aspirin, [  ] Contraindicated, Reason:         [ ] Plavix, [  ] Contraindicated, Reason:         [ ] Statin, [  ] Contraindicated, Reason:         [ ] Lasix , [  ] Contraindicated, Reason:              Duration:          [ ] Beta-Blocker, [  ] Contraindicated, Reason:         Cardiac Rehab contraindicated due to recent cardiac surgery.         Surgical Valve         [ ] Aspirin, [  ] Contraindicated, Reason:         [ ] Lasix, [  ] Contraindicated, Reason:              Duration:          [ ] Beta-Blocker, [  ] Contraindicated, Reason:     TAVR        Anticoagulation plan: [ ] Aspirin, [ ] Plavix, [ ] Eliquis, [ ] Other:        Keep one:        Acute on chronic diastolic heart failure treated with TAVR + IV diuresis        Chronic diastolic heart failure treated with TAVR        Acute on chronic diastolic and systolic heart failure treated with TAVR + IV diuresis        Chronic diastolic and systolic heart failure treated with TAVR         PCI         [ ] Aspirin, [  ] Contraindicated, Reason:         [ ] Plavix, [ ] Contraindicated, Reason:         [ ] Statin, [  ] Contraindicated, Reason:         Cardiac rehab deferred until 30 days after definitive aortic valve therapy        Anticoagulation         [ ] NOAC – Name, [ ] Reason:               Cost/Insurance barriers addressed: YES/NO          [ ] Coumadin, Indication:                INR Goal:               Follow up:   _ _ _ _ _ _ _ _ _ _ _   RELEVANT LABS/IMAGING:   _  _ _ _ _ _ _ _ _ _ _   CLINICAL FOLLOW UP NEEDS:      [ ] Lab work needed:      [ ] Imaging needed:      [ ] Home equipment            Type: (i.e. wound vac, pneumostat, prevena, wet/dry dressings, picc/midlines, MCOT, lopez etc)   _ _ _ _ _ _ _ _ _ _ _ _   Over 35 minutes was spent with the patient reviewing the discharge material including medications, follow up appointments, recovery, concerning symptoms, and how to contact their health care providers if they have questions.   Patient discussed on morning rounds with Dr. Patrick  Operation Date: 10/18 MIDCAB, JOAQUIN clip  Primary Surgeon/Attending MD: Darnell  Referring Physician: Dr. Hua  _ _ _ _ _ _ _ _ _ _ _ _   HOSPITAL COURSE:     78 YO Korean-speaking Male with PMHx of HTN, HLD, DMII (A1C 11.7%), pAF (on Eliquis), HFpEF, CKD-stage II, MI s/p PCI->OM1 in 2019 and NSTEMI 1/17/24 s/p Cardiac cath w/mid LAD stenosis s/p PTCA c/b dissection, now s/p recent NSTEMI w/ severe LAD stenosis. Patient presented to [facility] on 9/30/24 with new onset chest pain at rest. In the ED, he was ruled in for NSTEMI. 10/1/24 TTE revealed LVEF 60%, dilated ascending aorta, no significant valvular disease. 10/1/24 s/p Cardiac cath that revealed patent stent in OM1, 80-90% mid-distal LAD stenosis. Patient medically managed and discharged home on 10/2. Pt presented on [facility] on 10/18 and undersent MIDCAB, JOAQUIN occlusion with Dr. Patrick. Arrived to ICU extubated on no drips. POD1, started on BB, endocrine consulted for labile blood sugars with A1c of 9. Transferred to 9La. Tubes kept for high output. POD2, insulin redosed. POD3, pt with L sided c/p, likely related to pericarditis, diffuse ST elevations on EKG, not started on medrol dose pack given A1c of 9, pleural tube removed and started on ASA/ Eliquis. POD4, patient with soft BPs, endocrine requesting to monitor patient one more day, now that he is taking full meals. POD 5 Pt feeling well **INCOMPLETE NOTE**    _ _ _ _ _ _ _ _ _ _ _ _     DISCHARGE PHYSICAL EXAM:     PHYSICAL EXAM:  General: resting comfortably in bed in NAD  Neurological: AOx3. Motor skills grossly intact  Cardiovascular: Normal S1/S2. Regular rate/rhythm. No murmurs  Respiratory: Lungs CTA bilaterally. No wheezing or rales  Gastrointestinal: +BS in all 4 quadrants. Non-distended. Soft. Non-tender  Extremities: Strength 5/5 b/l upper/lower extremities. Sensation grossly intact upper/lower extremities. No edema. No calf tenderness.  Vascular: Radial 2+bilaterally, DP 2+ b/l  Incision Sites: thoracotomy incision clean, dry, intact    _ _ _ _ _ _ _ _ _ _ _ _   REMOVAL CHECKLIST:         [ x] Epicardial wires         [ no] Stitches/tie downs,   Midcab, tubes removed recently        [ x] PICC/Midline,   If no, why?    _ _ _ _ _ _ _ _ _ _ _ _   MEDICATION DISCHARGE CHECKLIST     MIDCAB/PCI         [ x] Aspirin, [  ] Contraindicated, Reason:         [ ] Plavix, [ x ] Contraindicated, Reason: on eliquis        [ x] Statin, [  ] Contraindicated, Reason:         [ x] Lasix , [  ] Contraindicated, Reason:              Duration:  7 days        [ x] Beta-Blocker, [  ] Contraindicated, Reason:         Cardiac Rehab contraindicated due to recent cardiac surgery.           Anticoagulation         [ x] NOAC – Name, [ eliquis] Reason: afib                     _ _ _ _ _ _ _ _ _ _ _ _   Over 35 minutes was spent with the patient reviewing the discharge material including medications, follow up appointments, recovery, concerning symptoms, and how to contact their health care providers if they have questions.   Patient discussed on morning rounds with Dr. Patrick  Operation Date: 10/18 MIDCAB, JOAQUIN clip  Primary Surgeon/Attending MD: Darnell  Referring Physician: Dr. Hua  _ _ _ _ _ _ _ _ _ _ _ _   HOSPITAL COURSE:     80 YO Kazakh-speaking Male with PMHx of HTN, HLD, DMII (A1C 11.7%), pAF (on Eliquis), HFpEF, CKD-stage II, MI s/p PCI->OM1 in 2019 and NSTEMI 1/17/24 s/p Cardiac cath w/mid LAD stenosis s/p PTCA c/b dissection, now s/p recent NSTEMI w/ severe LAD stenosis. Patient presented to [facility] on 9/30/24 with new onset chest pain at rest. In the ED, he was ruled in for NSTEMI. 10/1/24 TTE revealed LVEF 60%, dilated ascending aorta, no significant valvular disease. 10/1/24 s/p Cardiac cath that revealed patent stent in OM1, 80-90% mid-distal LAD stenosis. Patient medically managed and discharged home on 10/2. Pt presented on [facility] on 10/18 and undersent MIDCAB, JOAQUIN occlusion with Dr. Patrick. Arrived to ICU extubated on no drips. POD1, started on BB, endocrine consulted for labile blood sugars with A1c of 9. Transferred to 9La. Tubes kept for high output. POD2, insulin redosed. POD3, pt with L sided c/p, likely related to pericarditis, diffuse ST elevations on EKG, not started on medrol dose pack given A1c of 9, pleural tube removed and started on ASA/ Eliquis. POD4, patient with soft BPs, endocrine requesting to monitor patient one more day, now that he is taking full meals. POD 5 Pt feeling well, sugars controlled. DC reccs given by endocrine. Pt will be discharged with ASA/Eliquis per Dr. Patrick. Cleared for DC home today with family by Dr. Patrick.     _ _ _ _ _ _ _ _ _ _ _ _     DISCHARGE PHYSICAL EXAM:     PHYSICAL EXAM:  General: resting comfortably in bed in NAD  Neurological: AOx3. Motor skills grossly intact  Cardiovascular: Normal S1/S2. Regular rate/rhythm. No murmurs  Respiratory: Lungs CTA bilaterally. No wheezing or rales  Gastrointestinal: +BS in all 4 quadrants. Non-distended. Soft. Non-tender  Extremities: Strength 5/5 b/l upper/lower extremities. Sensation grossly intact upper/lower extremities. No edema. No calf tenderness.  Vascular: Radial 2+bilaterally, DP 2+ b/l  Incision Sites: thoracotomy incision clean, dry, intact    _ _ _ _ _ _ _ _ _ _ _ _   REMOVAL CHECKLIST:         [ x] Epicardial wires         [ no] Stitches/tie downs,   Midcab, tubes removed recently        [ x] PICC/Midline,   If no, why?    _ _ _ _ _ _ _ _ _ _ _ _   MEDICATION DISCHARGE CHECKLIST     MIDCAB/PCI         [ x] Aspirin, [  ] Contraindicated, Reason:         [ ] Plavix, [ x ] Contraindicated, Reason: on eliquis        [ x] Statin, [  ] Contraindicated, Reason:         [ x] Lasix , [  ] Contraindicated, Reason:              Duration:  7 days        [ x] Beta-Blocker, [  ] Contraindicated, Reason:         Cardiac Rehab contraindicated due to recent cardiac surgery.           Anticoagulation         [ x] NOAC – Name, [ eliquis] Reason: afib                     _ _ _ _ _ _ _ _ _ _ _ _   Over 35 minutes was spent with the patient reviewing the discharge material including medications, follow up appointments, recovery, concerning symptoms, and how to contact their health care providers if they have questions.

## 2024-10-23 NOTE — DISCHARGE NOTE PROVIDER - PROVIDER TOKENS
PROVIDER:[TOKEN:[[id number]],SCHEDULEDAPPT:[10/29/2024],SCHEDULEDAPPTTIME:[10:00 AM]],FREE:[LAST:[[facility]],PHONE:[phone]],FAX:[(   [phone]    -],ADDRESS:[[address], [address] [place] [address]]]

## 2024-10-23 NOTE — PROGRESS NOTE ADULT - PROVIDER SPECIALTY LIST ADULT
CT Surgery
Critical Care
CT Surgery
Critical Care
Endocrinology
Endocrinology
CT Surgery
Endocrinology
Endocrinology

## 2024-10-23 NOTE — PROGRESS NOTE ADULT - PROBLEM SELECTOR PLAN 1
Type 2 diabetes mellitus with hyperglycemia  - Please continue lantus  18 units at bedtime.   - Continue lispro 14  units before each meal.  - Continue lispro moderate dose sliding scale before meals and at bedtime.  - Patient's fingerstick glucose goal is 100-180 mg/dL.    - For discharge: Basal, dose TBD + premeal, dose TBD. Continue invokana. Stop glipizide. Increased glucose monitoring, 4x/day before meals and at bedtime.  - Patient can follow up at discharge with [facility] by calling [phone] to make an appointment.      Case discussed with Dr. Oseguera. Primary team updated. Type 2 diabetes mellitus with hyperglycemia  - Please increase lantus to 20 units at bedtime.   - Continue lispro 14  units before each meal.  - Continue lispro moderate dose sliding scale before meals and at bedtime.  - Patient's fingerstick glucose goal is 100-180 mg/dL.    - For discharge: Decrease home dose of lantus to 20 units at bedtime, start lispro 14 units before meals. Continue invokana. Stop glipizide. Increased glucose monitoring, 4x/day before meals and at bedtime.  - Patient can follow up at discharge with [facility] by calling [phone] to make an appointment.       Primary team updated.

## 2024-10-23 NOTE — DISCHARGE NOTE PROVIDER - NSDCFUSCHEDAPPT_GEN_ALL_CORE_FT
Will Patrick  [facility]  [facility] [address]  Scheduled Appointment: 10/29/2024

## 2024-10-23 NOTE — DISCHARGE NOTE PROVIDER - NSDCMRMEDTOKEN_GEN_ALL_CORE_FT
amLODIPine 5 mg oral tablet: 1 tab(s) orally once a day  atorvastatin 80 mg oral tablet: 1 tab(s) orally once a day (at bedtime)  carvedilol 25 mg oral tablet: 1 tab(s) orally 2 times a day  DULoxetine 60 mg oral delayed release capsule: 1 cap(s) orally once a day (at bedtime)  Eliquis 5 mg oral tablet: 1 tab(s) orally 2 times a day  furosemide 20 mg oral tablet: 1 tab(s) orally every other day  glipiZIDE 10 mg oral tablet: 1 tab(s) orally once a day  Invokana 300 mg oral tablet: 1 tab(s) orally once a day  isosorbide mononitrate 30 mg oral tablet, extended release: 1 tab(s) orally once a day  Lantus 100 units/mL subcutaneous solution: 40 unit(s) subcutaneous once a day (at bedtime)  losartan 25 mg oral tablet: 1 tab(s) orally once a day  repaglinide 2 mg oral tablet: 1 tab(s) orally 3 times a day  tamsulosin 0.4 mg oral capsule: 1 cap(s) orally once a day  Zetia 10 mg oral tablet: 1 tab(s) orally once a day   acetaminophen 500 mg oral tablet: 2 tab(s) orally every 6 hours as needed for Mild Pain (1 - 3)  apixaban 5 mg oral tablet: 1 tab(s) orally every 12 hours  aspirin 81 mg oral delayed release tablet: 1 tab(s) orally once a day  atorvastatin 80 mg oral tablet: 1 tab(s) orally once a day (at bedtime)  colchicine 0.6 mg oral tablet: 1 tab(s) orally once a day  DULoxetine 60 mg oral delayed release capsule: 1 cap(s) orally once a day (at bedtime)  furosemide 20 mg oral tablet: 1 tab(s) orally once a day  Glucometer: use three times daily as instructed  glucometer test strip: use three times daily as instructed  HumaLOG KwikPen 100 units/mL injectable solution: 14 unit(s) injectable 3 times a day (with meals)  Invokana 300 mg oral tablet: 1 tab(s) orally once a day  Lancet: Use three times daily as intructed with fingerstick checks  Lantus 100 units/mL subcutaneous solution: 20 unit(s) subcutaneous once a day (at bedtime)  metoprolol tartrate 37.5 mg oral tablet: 1 tab(s) orally every 12 hours  oxyCODONE 5 mg oral tablet: 1 tab(s) orally every 4 hours as needed for Moderate Pain (4 - 6) MDD: 4  pantoprazole 40 mg oral delayed release tablet: 1 tab(s) orally once a day (before a meal)  polyethylene glycol 3350 oral powder for reconstitution: 17 gram(s) orally once a day as needed for  constipation  Potassium Chloride (Eqv-Klor-Con 10) 10 mEq oral tablet, extended release: 1 tab(s) orally once a day  Rubbing Alcohol Wipes 70% topical pad: Apply topically to affected area once a day use PRN with fingersticks pre meal  tamsulosin 0.4 mg oral capsule: 1 cap(s) orally once a day  Zetia 10 mg oral tablet: 1 tab(s) orally once a day

## 2024-10-23 NOTE — DISCHARGE NOTE PROVIDER - CARE PROVIDER_API CALL
Will Patrick  Thoracic and Cardiac Surgery  [address]  [place] [address]  Phone: [phone]  Fax: [phone]  Scheduled Appointment: 10/29/2024 10:00 AM    [facility],   [address], [address], [place] [address]  Phone: [phone]  Fax: (   )    -  Follow Up Time:

## 2024-10-23 NOTE — PROGRESS NOTE ADULT - SUBJECTIVE AND OBJECTIVE BOX
SUBJECTIVE / INTERVAL HPI: Patient was seen and examined this morning. Patient was seen and examined this morning.  Endocrine is consulted for diabetes management (chronic, exacerbated).      CAPILLARY BLOOD GLUCOSE & INSULIN RECEIVED  93 mg/dL (10-21 @ 21:36) - Lantus 18  144 mg/dL (10-22 @ 05:30)  136 mg/dL (10-22 @ 06:20) - Lispro 14  148 mg/dL (10-22 @ 07:57)  235 mg/dL (10-22 @ 10:55) - Lispro 14+4  181 mg/dL (10-22 @ 16:42) - Lispro 14+2  119 mg/dL (10-22 @ 21:16) - Lantus 18  150 mg/dL (10-23 @ 05:30)  154 mg/dL (10-23 @ 06:52) - Lispro 14+2  mg/dL (10-23 @ lunch)      REVIEW OF SYSTEMS  Constitutional:  Negative fever, chills or loss of appetite.  Eyes:  Negative blurry vision or double vision.  Cardiovascular:  Negative for chest pain or palpitations.  Respiratory:  Negative for cough, wheezing, or shortness of breath.    Gastrointestinal:  Negative for nausea, vomiting, diarrhea, constipation, or abdominal pain.  Genitourinary:  Negative frequency, urgency or dysuria.  Neurologic:  No headache, confusion, dizziness, lightheadedness.    PHYSICAL EXAM  Vital Signs Last 24 Hrs  T(C): 36.5 (23 Oct 2024 09:14), Max: 37.4 (23 Oct 2024 01:11)  T(F): 97.7 (23 Oct 2024 09:14), Max: 99.3 (23 Oct 2024 01:11)  HR: 98 (23 Oct 2024 08:35) (88 - 103)  BP: 119/65 (23 Oct 2024 08:35) (95/53 - 119/72)  BP(mean): 86 (23 Oct 2024 08:35) (70 - 91)  RR: 18 (23 Oct 2024 08:35) (17 - 18)  SpO2: 98% (23 Oct 2024 08:35) (96% - 98%)    Parameters below as of 23 Oct 2024 08:35  Patient On (Oxygen Delivery Method): room air        Constitutional: Awake, alert, in no acute distress.   HEENT: Normocephalic, atraumatic, ALVERTO.  Respiratory: Lungs clear to ausculation bilaterally.   Cardiovascular: regular rhythm, normal S1 and S2, no audible murmurs.   GI: soft, non-tender, non-distended, bowel sounds present.  Extremities: No lower extremity edema.  Psychiatric: AAO x 3. Normal affect/mood.     LABS  CBC - WBC/HGB/HTC/PLT: 9.13/9.8/30.4/251 (10-23-24)  BMP - Na/K/Cl/Bicarb/BUN/Cr/Gluc/AG/eGFR: 138/4.2/102/26/26/0.89/150/10/88 (10-23-24)  Ca - 8.1 (10-23-24)  Phos - -- (10-23-24)  Mg - 2.3 (10-23-24)  LFT - Alb/Tprot/Tbili/Dbili/AlkPhos/ALT/AST: 3.2/--/0.5/--/81/23/26 (10-23-24)  PT/aPTT/INR: 11.9/31.3/1.02 (10-23-24)   Thyroid Stimulating Hormone, Serum: 4.250 (10-17-24)      MEDICATIONS  MEDICATIONS  (STANDING):  apixaban 5 milliGRAM(s) Oral every 12 hours  ascorbic acid 500 milliGRAM(s) Oral daily  aspirin enteric coated 81 milliGRAM(s) Oral daily  atorvastatin 80 milliGRAM(s) Oral at bedtime  bisacodyl Suppository 10 milliGRAM(s) Rectal once  chlorhexidine 2% Cloths 1 Application(s) Topical daily  colchicine 0.6 milliGRAM(s) Oral daily  dextrose 5%. 1000 milliLiter(s) (100 mL/Hr) IV Continuous <Continuous>  dextrose 5%. 1000 milliLiter(s) (50 mL/Hr) IV Continuous <Continuous>  dextrose 50% Injectable 25 Gram(s) IV Push once  dextrose 50% Injectable 12.5 Gram(s) IV Push once  dextrose 50% Injectable 25 Gram(s) IV Push once  furosemide    Tablet 20 milliGRAM(s) Oral daily  gabapentin 100 milliGRAM(s) Oral every 8 hours  glucagon  Injectable 1 milliGRAM(s) IntraMuscular once  insulin glargine Injectable (LANTUS) 18 Unit(s) SubCutaneous at bedtime  insulin lispro (ADMELOG) corrective regimen sliding scale   SubCutaneous Before meals and at bedtime  insulin lispro Injectable (ADMELOG) 14 Unit(s) SubCutaneous three times a day before meals  metoprolol tartrate 37.5 milliGRAM(s) Oral every 12 hours  mupirocin 2% Nasal 1 Application(s) Both Nostrils two times a day  pantoprazole    Tablet 40 milliGRAM(s) Oral before breakfast  polyethylene glycol 3350 17 Gram(s) Oral daily  senna 2 Tablet(s) Oral at bedtime  sodium chloride 0.9% lock flush 3 milliLiter(s) IV Push every 8 hours  sodium chloride 0.9%. 1000 milliLiter(s) (10 mL/Hr) IV Continuous <Continuous>  tamsulosin 0.4 milliGRAM(s) Oral at bedtime    MEDICATIONS  (PRN):  acetaminophen     Tablet .. 1000 milliGRAM(s) Oral every 6 hours PRN Mild Pain (1 - 3)  dextrose Oral Gel 15 Gram(s) Oral once PRN Blood Glucose LESS THAN 70 milliGRAM(s)/deciliter  oxyCODONE    IR 5 milliGRAM(s) Oral every 4 hours PRN Moderate Pain (4 - 6)       SUBJECTIVE / INTERVAL HPI: Patient was seen and examined this morning. Patient was seen and examined this morning. Utilized  # [id number]. Pt planned for discharge art today with HHA. Glucose more stable today. Reviewed discharge instructions, specifically consistent carb diet with start of short acting premeal insulin. Advised to not take if he skips meal or it there is no carb at the meal. He expressed understanding.  Endocrine is consulted for diabetes management (chronic, exacerbated).      CAPILLARY BLOOD GLUCOSE & INSULIN RECEIVED  93 mg/dL (10-21 @ 21:36) - Lantus 18  144 mg/dL (10-22 @ 05:30)  136 mg/dL (10-22 @ 06:20) - Lispro 14  148 mg/dL (10-22 @ 07:57)  235 mg/dL (10-22 @ 10:55) - Lispro 14+4  181 mg/dL (10-22 @ 16:42) - Lispro 14+2  119 mg/dL (10-22 @ 21:16) - Lantus 18  150 mg/dL (10-23 @ 05:30)  154 mg/dL (10-23 @ 06:52) - Lispro 14+2  mg/dL (10-23 @ lunch)      REVIEW OF SYSTEMS  Constitutional:  Negative fever, chills or loss of appetite.  Eyes:  Negative blurry vision or double vision.  Cardiovascular:  Negative for chest pain or palpitations.  Respiratory:  Negative for cough, wheezing, or shortness of breath.    Gastrointestinal:  Negative for nausea, vomiting, diarrhea, constipation, or abdominal pain.  Genitourinary:  Negative frequency, urgency or dysuria.  Neurologic:  No headache, confusion, dizziness, lightheadedness.    PHYSICAL EXAM  Vital Signs Last 24 Hrs  T(C): 36.5 (23 Oct 2024 09:14), Max: 37.4 (23 Oct 2024 01:11)  T(F): 97.7 (23 Oct 2024 09:14), Max: 99.3 (23 Oct 2024 01:11)  HR: 98 (23 Oct 2024 08:35) (88 - 103)  BP: 119/65 (23 Oct 2024 08:35) (95/53 - 119/72)  BP(mean): 86 (23 Oct 2024 08:35) (70 - 91)  RR: 18 (23 Oct 2024 08:35) (17 - 18)  SpO2: 98% (23 Oct 2024 08:35) (96% - 98%)    Parameters below as of 23 Oct 2024 08:35  Patient On (Oxygen Delivery Method): room air        Constitutional: Awake, alert, in no acute distress.   HEENT: Normocephalic, atraumatic, ALVERTO.  Respiratory: Lungs clear to ausculation bilaterally.   Cardiovascular: regular rhythm, normal S1 and S2, no audible murmurs.   GI: soft, non-tender, non-distended, bowel sounds present.  Extremities: No lower extremity edema.  Psychiatric: AAO x 3. Normal affect/mood.     LABS  CBC - WBC/HGB/HTC/PLT: 9.13/9.8/30.4/251 (10-23-24)  BMP - Na/K/Cl/Bicarb/BUN/Cr/Gluc/AG/eGFR: 138/4.2/102/26/26/0.89/150/10/88 (10-23-24)  Ca - 8.1 (10-23-24)  Phos - -- (10-23-24)  Mg - 2.3 (10-23-24)  LFT - Alb/Tprot/Tbili/Dbili/AlkPhos/ALT/AST: 3.2/--/0.5/--/81/23/26 (10-23-24)  PT/aPTT/INR: 11.9/31.3/1.02 (10-23-24)   Thyroid Stimulating Hormone, Serum: 4.250 (10-17-24)      MEDICATIONS  MEDICATIONS  (STANDING):  apixaban 5 milliGRAM(s) Oral every 12 hours  ascorbic acid 500 milliGRAM(s) Oral daily  aspirin enteric coated 81 milliGRAM(s) Oral daily  atorvastatin 80 milliGRAM(s) Oral at bedtime  bisacodyl Suppository 10 milliGRAM(s) Rectal once  chlorhexidine 2% Cloths 1 Application(s) Topical daily  colchicine 0.6 milliGRAM(s) Oral daily  dextrose 5%. 1000 milliLiter(s) (100 mL/Hr) IV Continuous <Continuous>  dextrose 5%. 1000 milliLiter(s) (50 mL/Hr) IV Continuous <Continuous>  dextrose 50% Injectable 25 Gram(s) IV Push once  dextrose 50% Injectable 12.5 Gram(s) IV Push once  dextrose 50% Injectable 25 Gram(s) IV Push once  furosemide    Tablet 20 milliGRAM(s) Oral daily  gabapentin 100 milliGRAM(s) Oral every 8 hours  glucagon  Injectable 1 milliGRAM(s) IntraMuscular once  insulin glargine Injectable (LANTUS) 18 Unit(s) SubCutaneous at bedtime  insulin lispro (ADMELOG) corrective regimen sliding scale   SubCutaneous Before meals and at bedtime  insulin lispro Injectable (ADMELOG) 14 Unit(s) SubCutaneous three times a day before meals  metoprolol tartrate 37.5 milliGRAM(s) Oral every 12 hours  mupirocin 2% Nasal 1 Application(s) Both Nostrils two times a day  pantoprazole    Tablet 40 milliGRAM(s) Oral before breakfast  polyethylene glycol 3350 17 Gram(s) Oral daily  senna 2 Tablet(s) Oral at bedtime  sodium chloride 0.9% lock flush 3 milliLiter(s) IV Push every 8 hours  sodium chloride 0.9%. 1000 milliLiter(s) (10 mL/Hr) IV Continuous <Continuous>  tamsulosin 0.4 milliGRAM(s) Oral at bedtime    MEDICATIONS  (PRN):  acetaminophen     Tablet .. 1000 milliGRAM(s) Oral every 6 hours PRN Mild Pain (1 - 3)  dextrose Oral Gel 15 Gram(s) Oral once PRN Blood Glucose LESS THAN 70 milliGRAM(s)/deciliter  oxyCODONE    IR 5 milliGRAM(s) Oral every 4 hours PRN Moderate Pain (4 - 6)

## 2024-10-23 NOTE — DISCHARGE NOTE PROVIDER - NSDCFUADDAPPT_GEN_ALL_CORE_FT
Please attend your scheduled follow up appointment with Dr. Patrick, if you have any questions, please call [phone]. Please call [facility] endocrinology to schedule your follow up appointment within 2 weeks.

## 2024-10-23 NOTE — PROGRESS NOTE ADULT - ASSESSMENT
This is a 79 year old man with PMH of type 2 DM (A1c 11.7%, was on Lantus 30u at home, invokana, glipizide), pAF on AC, CAD s/p prior PCI, HTN, HLD admitted for NSTEMI, s/p MIDCAB 10/18/24. In addition, TSH 4.25 noted - repeat as P{    Endocrine is consulted for type 2 diabetes management (chronic,  exacerbated).    Continued diet education specifically consistent carb diet to balance insulin dose.    24 hour glucose data reviewed.  Insulin adjustment    A1C: 9.1 % - Above target.  C-peptide 3.1 with  (Oct 2024)  BUN: 26  Creatinine: 0.89 - normal  GFR: 88  Weight: 65.3  BMI: 23.2 - normal  EF: 60% preop - normal

## 2024-10-23 NOTE — DISCHARGE NOTE NURSING/CASE MANAGEMENT/SOCIAL WORK - NSDCFUADDAPPT_GEN_ALL_CORE_FT
Please attend your scheduled follow up appointment with Dr. Patrick, if you have any questions, please call [phone]. Please call [facility] endocrinology to schedule your follow up appointment within 2 weeks.

## 2024-10-23 NOTE — DISCHARGE NOTE PROVIDER - CARE PROVIDERS DIRECT ADDRESSES
[email or web address]

## 2024-10-23 NOTE — DISCHARGE NOTE NURSING/CASE MANAGEMENT/SOCIAL WORK - FINANCIAL ASSISTANCE
[facility] provides services at a reduced cost to those who are determined to be eligible through [facility]’s financial assistance program. Information regarding [facility]’s financial assistance program can be found by going to [email or web address] or by calling [phone].

## 2024-10-23 NOTE — DISCHARGE NOTE NURSING/CASE MANAGEMENT/SOCIAL WORK - PATIENT PORTAL LINK FT
You can access the FollowMyHealth Patient Portal offered by [facility] by registering at the following website: [email or web address]. By joining [employer]’s FollowMyHealth portal, you will also be able to view your health information using other applications (apps) compatible with our system.

## 2024-10-23 NOTE — PROGRESS NOTE ADULT - TIME BILLING
Bedside exam and interview.  Reviewed labs and glucose.  Insulin adjustment.  Education (Diet, medication).  Discussed plan of care with primary team.  Documentation of encounter.

## 2024-10-24 ENCOUNTER — APPOINTMENT (OUTPATIENT)
Dept: CARE COORDINATION | Facility: HOME HEALTH | Age: 79
End: 2024-10-24
Payer: MEDICARE

## 2024-10-24 ENCOUNTER — TRANSCRIPTION ENCOUNTER (OUTPATIENT)
Age: 79
End: 2024-10-24

## 2024-10-24 VITALS
OXYGEN SATURATION: 96 % | HEART RATE: 104 BPM | DIASTOLIC BLOOD PRESSURE: 70 MMHG | RESPIRATION RATE: 16 BRPM | WEIGHT: 144 LBS | BODY MASS INDEX: 21.27 KG/M2 | TEMPERATURE: 100 F | SYSTOLIC BLOOD PRESSURE: 146 MMHG

## 2024-10-24 DIAGNOSIS — I48.0 PAROXYSMAL ATRIAL FIBRILLATION: ICD-10-CM

## 2024-10-24 DIAGNOSIS — I25.10 ATHEROSCLEROTIC HEART DISEASE OF NATIVE CORONARY ARTERY W/OUT ANGINA PECTORIS: ICD-10-CM

## 2024-10-24 DIAGNOSIS — Z98.890 OTHER SPECIFIED POSTPROCEDURAL STATES: ICD-10-CM

## 2024-10-24 DIAGNOSIS — N18.2 TYPE 2 DIABETES MELLITUS WITH DIABETIC CHRONIC KIDNEY DISEASE: ICD-10-CM

## 2024-10-24 DIAGNOSIS — Z95.1 PRESENCE OF AORTOCORONARY BYPASS GRAFT: ICD-10-CM

## 2024-10-24 DIAGNOSIS — Z09 ENCOUNTER FOR FOLLOW-UP EXAMINATION AFTER COMPLETED TREATMENT FOR CONDITIONS OTHER THAN MALIGNANT NEOPLASM: ICD-10-CM

## 2024-10-24 DIAGNOSIS — I24.9 ACUTE ISCHEMIC HEART DISEASE, UNSPECIFIED: ICD-10-CM

## 2024-10-24 DIAGNOSIS — E11.22 TYPE 2 DIABETES MELLITUS WITH DIABETIC CHRONIC KIDNEY DISEASE: ICD-10-CM

## 2024-10-24 PROCEDURE — 99024 POSTOP FOLLOW-UP VISIT: CPT

## 2024-10-24 RX ORDER — PANTOPRAZOLE 40 MG/1
40 TABLET, DELAYED RELEASE ORAL DAILY
Qty: 30 | Refills: 0 | Status: ACTIVE | COMMUNITY
Start: 2024-10-24 | End: 1900-01-01

## 2024-10-24 RX ORDER — INSULIN GLARGINE 100 [IU]/ML
100 INJECTION, SOLUTION SUBCUTANEOUS AT BEDTIME
Refills: 0 | Status: ACTIVE | COMMUNITY

## 2024-10-24 RX ORDER — COLCHICINE 0.6 MG/1
0.6 TABLET ORAL DAILY
Qty: 30 | Refills: 0 | Status: ACTIVE | COMMUNITY
Start: 2024-10-24 | End: 1900-01-01

## 2024-10-24 RX ORDER — INSULIN LISPRO 100 [IU]/ML
100 INJECTION, SOLUTION INTRAVENOUS; SUBCUTANEOUS 3 TIMES DAILY
Refills: 0 | Status: COMPLETED | COMMUNITY
End: 2024-10-24

## 2024-10-24 RX ORDER — DULOXETINE HYDROCHLORIDE 60 MG/1
60 CAPSULE, DELAYED RELEASE PELLETS ORAL AT BEDTIME
Qty: 30 | Refills: 0 | Status: ACTIVE | COMMUNITY
Start: 2024-10-24 | End: 1900-01-01

## 2024-10-24 RX ORDER — BLOOD-GLUCOSE METER
W/DEVICE KIT MISCELLANEOUS
Qty: 1 | Refills: 0 | Status: ACTIVE | COMMUNITY
Start: 2024-10-24 | End: 1900-01-01

## 2024-10-24 RX ORDER — PANTOPRAZOLE 40 MG/1
40 TABLET, DELAYED RELEASE ORAL DAILY
Qty: 30 | Refills: 0 | Status: COMPLETED | COMMUNITY
End: 2024-10-24

## 2024-10-24 RX ORDER — EZETIMIBE 10 MG/1
10 TABLET ORAL DAILY
Qty: 30 | Refills: 0 | Status: ACTIVE | COMMUNITY
Start: 2024-10-24 | End: 1900-01-01

## 2024-10-24 RX ORDER — INSULIN LISPRO 100 [IU]/ML
100 INJECTION, SOLUTION INTRAVENOUS; SUBCUTANEOUS 3 TIMES DAILY
Qty: 1 | Refills: 0 | Status: ACTIVE | COMMUNITY
Start: 2024-10-24 | End: 1900-01-01

## 2024-10-24 RX ORDER — ASPIRIN ENTERIC COATED TABLETS 81 MG 81 MG/1
81 TABLET, DELAYED RELEASE ORAL DAILY
Qty: 30 | Refills: 0 | Status: ACTIVE | COMMUNITY
Start: 2024-10-24 | End: 1900-01-01

## 2024-10-24 RX ORDER — ISOPROPYL ALCOHOL 0.7 ML/ML
SWAB TOPICAL
Qty: 1 | Refills: 0 | Status: ACTIVE | COMMUNITY
Start: 2024-10-24 | End: 1900-01-01

## 2024-10-24 RX ORDER — APIXABAN 5 MG/1
5 TABLET, FILM COATED ORAL
Qty: 180 | Refills: 1 | Status: ACTIVE | COMMUNITY
Start: 2024-10-24 | End: 1900-01-01

## 2024-10-24 RX ORDER — METOPROLOL TARTRATE 25 MG/1
25 TABLET ORAL
Qty: 40 | Refills: 0 | Status: ACTIVE | COMMUNITY
Start: 2024-10-24 | End: 1900-01-01

## 2024-10-24 RX ORDER — LANCETS 28 GAUGE
EACH MISCELLANEOUS
Qty: 1 | Refills: 0 | Status: ACTIVE | COMMUNITY
Start: 2024-10-24 | End: 1900-01-01

## 2024-10-24 RX ORDER — CANAGLIFLOZIN 300 MG/1
300 TABLET, FILM COATED ORAL DAILY
Qty: 30 | Refills: 0 | Status: ACTIVE | COMMUNITY
Start: 2024-10-24 | End: 1900-01-01

## 2024-10-24 RX ORDER — POTASSIUM CHLORIDE 750 MG/1
10 TABLET, EXTENDED RELEASE ORAL DAILY
Qty: 30 | Refills: 0 | Status: COMPLETED | COMMUNITY
End: 2024-10-24

## 2024-10-24 RX ORDER — OXYCODONE 5 MG/1
5 TABLET ORAL
Refills: 0 | Status: COMPLETED | COMMUNITY
End: 2024-10-24

## 2024-10-24 RX ORDER — FUROSEMIDE 40 MG/1
40 TABLET ORAL
Qty: 7 | Refills: 0 | Status: COMPLETED | COMMUNITY
Start: 2024-10-24 | End: 2024-10-24

## 2024-10-24 RX ORDER — METOPROLOL TARTRATE 25 MG/1
25 TABLET ORAL
Qty: 90 | Refills: 0 | Status: COMPLETED | COMMUNITY
End: 2024-10-24

## 2024-10-24 RX ORDER — TAMSULOSIN HYDROCHLORIDE 0.4 MG/1
0.4 CAPSULE ORAL AT BEDTIME
Qty: 30 | Refills: 0 | Status: ACTIVE | COMMUNITY
Start: 2024-10-24

## 2024-10-24 RX ORDER — ACETAMINOPHEN 500 MG/1
500 TABLET ORAL EVERY 6 HOURS
Qty: 30 | Refills: 0 | Status: ACTIVE | COMMUNITY
Start: 2024-10-24 | End: 1900-01-01

## 2024-10-24 RX ORDER — ATORVASTATIN CALCIUM 80 MG/1
80 TABLET, FILM COATED ORAL
Qty: 30 | Refills: 1 | Status: ACTIVE | COMMUNITY
Start: 2024-10-24 | End: 1900-01-01

## 2024-10-24 RX ORDER — COLCHICINE 0.6 MG/1
0.6 TABLET ORAL DAILY
Refills: 0 | Status: COMPLETED | COMMUNITY
End: 2024-10-24

## 2024-10-24 RX ORDER — ASPIRIN 81 MG
81 TABLET, DELAYED RELEASE (ENTERIC COATED) ORAL DAILY
Qty: 30 | Refills: 6 | Status: COMPLETED | COMMUNITY
End: 2024-10-24

## 2024-10-24 RX ORDER — OXYCODONE 5 MG/1
5 TABLET ORAL
Qty: 42 | Refills: 0 | Status: ACTIVE | COMMUNITY
Start: 2024-10-24 | End: 1900-01-01

## 2024-10-24 RX ORDER — POTASSIUM CHLORIDE 750 MG/1
10 TABLET, FILM COATED, EXTENDED RELEASE ORAL DAILY
Qty: 7 | Refills: 0 | Status: ACTIVE | COMMUNITY
Start: 2024-10-24 | End: 1900-01-01

## 2024-10-25 ENCOUNTER — NON-APPOINTMENT (OUTPATIENT)
Age: 79
End: 2024-10-25

## 2024-10-28 DIAGNOSIS — I25.2 OLD MYOCARDIAL INFARCTION: ICD-10-CM

## 2024-10-28 DIAGNOSIS — I48.0 PAROXYSMAL ATRIAL FIBRILLATION: ICD-10-CM

## 2024-10-28 DIAGNOSIS — Z79.01 LONG TERM (CURRENT) USE OF ANTICOAGULANTS: ICD-10-CM

## 2024-10-28 DIAGNOSIS — I31.9 DISEASE OF PERICARDIUM, UNSPECIFIED: ICD-10-CM

## 2024-10-28 DIAGNOSIS — Z79.4 LONG TERM (CURRENT) USE OF INSULIN: ICD-10-CM

## 2024-10-28 DIAGNOSIS — Z79.84 LONG TERM (CURRENT) USE OF ORAL HYPOGLYCEMIC DRUGS: ICD-10-CM

## 2024-10-28 DIAGNOSIS — N18.2 CHRONIC KIDNEY DISEASE, STAGE 2 (MILD): ICD-10-CM

## 2024-10-28 DIAGNOSIS — I50.32 CHRONIC DIASTOLIC (CONGESTIVE) HEART FAILURE: ICD-10-CM

## 2024-10-28 DIAGNOSIS — Z87.891 PERSONAL HISTORY OF NICOTINE DEPENDENCE: ICD-10-CM

## 2024-10-28 DIAGNOSIS — I25.10 ATHEROSCLEROTIC HEART DISEASE OF NATIVE CORONARY ARTERY WITHOUT ANGINA PECTORIS: ICD-10-CM

## 2024-10-28 DIAGNOSIS — Z95.5 PRESENCE OF CORONARY ANGIOPLASTY IMPLANT AND GRAFT: ICD-10-CM

## 2024-10-28 DIAGNOSIS — R94.31 ABNORMAL ELECTROCARDIOGRAM [ECG] [EKG]: ICD-10-CM

## 2024-10-28 DIAGNOSIS — E11.65 TYPE 2 DIABETES MELLITUS WITH HYPERGLYCEMIA: ICD-10-CM

## 2024-10-28 DIAGNOSIS — E78.5 HYPERLIPIDEMIA, UNSPECIFIED: ICD-10-CM

## 2024-10-28 DIAGNOSIS — D62 ACUTE POSTHEMORRHAGIC ANEMIA: ICD-10-CM

## 2024-10-28 DIAGNOSIS — N40.0 BENIGN PROSTATIC HYPERPLASIA WITHOUT LOWER URINARY TRACT SYMPTOMS: ICD-10-CM

## 2024-10-28 DIAGNOSIS — I77.810 THORACIC AORTIC ECTASIA: ICD-10-CM

## 2024-10-28 DIAGNOSIS — I21.4 NON-ST ELEVATION (NSTEMI) MYOCARDIAL INFARCTION: ICD-10-CM

## 2024-10-28 DIAGNOSIS — E11.22 TYPE 2 DIABETES MELLITUS WITH DIABETIC CHRONIC KIDNEY DISEASE: ICD-10-CM

## 2024-10-29 ENCOUNTER — OUTPATIENT (OUTPATIENT)
Dept: OUTPATIENT SERVICES | Facility: HOSPITAL | Age: 79
LOS: 1 days | End: 2024-10-29
Payer: MEDICARE

## 2024-10-29 ENCOUNTER — APPOINTMENT (OUTPATIENT)
Dept: CARDIOTHORACIC SURGERY | Facility: CLINIC | Age: 79
End: 2024-10-29
Payer: MEDICARE

## 2024-10-29 VITALS
OXYGEN SATURATION: 96 % | HEIGHT: 69 IN | SYSTOLIC BLOOD PRESSURE: 109 MMHG | BODY MASS INDEX: 21.03 KG/M2 | HEART RATE: 92 BPM | DIASTOLIC BLOOD PRESSURE: 65 MMHG | WEIGHT: 142 LBS | TEMPERATURE: 97.6 F

## 2024-10-29 PROCEDURE — 71046 X-RAY EXAM CHEST 2 VIEWS: CPT | Mod: 26

## 2024-10-29 PROCEDURE — 99024 POSTOP FOLLOW-UP VISIT: CPT

## 2024-10-29 PROCEDURE — 71046 X-RAY EXAM CHEST 2 VIEWS: CPT

## 2024-10-30 VITALS
DIASTOLIC BLOOD PRESSURE: 65 MMHG | TEMPERATURE: 97.6 F | OXYGEN SATURATION: 96 % | BODY MASS INDEX: 21.03 KG/M2 | HEART RATE: 92 BPM | SYSTOLIC BLOOD PRESSURE: 109 MMHG | HEIGHT: 69 IN | WEIGHT: 142 LBS | RESPIRATION RATE: 16 BRPM

## 2024-10-31 ENCOUNTER — NON-APPOINTMENT (OUTPATIENT)
Age: 79
End: 2024-10-31

## 2024-11-26 PROCEDURE — 84132 ASSAY OF SERUM POTASSIUM: CPT

## 2024-11-26 PROCEDURE — 84443 ASSAY THYROID STIM HORMONE: CPT

## 2024-11-26 PROCEDURE — 84681 ASSAY OF C-PEPTIDE: CPT

## 2024-11-26 PROCEDURE — 85025 COMPLETE CBC W/AUTO DIFF WBC: CPT

## 2024-11-26 PROCEDURE — 82947 ASSAY GLUCOSE BLOOD QUANT: CPT

## 2024-11-26 PROCEDURE — 84100 ASSAY OF PHOSPHORUS: CPT

## 2024-11-26 PROCEDURE — 81001 URINALYSIS AUTO W/SCOPE: CPT

## 2024-11-26 PROCEDURE — 83880 ASSAY OF NATRIURETIC PEPTIDE: CPT

## 2024-11-26 PROCEDURE — 87086 URINE CULTURE/COLONY COUNT: CPT

## 2024-11-26 PROCEDURE — P9045: CPT

## 2024-11-26 PROCEDURE — C1889: CPT

## 2024-11-26 PROCEDURE — 93306 TTE W/DOPPLER COMPLETE: CPT

## 2024-11-26 PROCEDURE — 86891 AUTOLOGOUS BLOOD OP SALVAGE: CPT

## 2024-11-26 PROCEDURE — 86900 BLOOD TYPING SEROLOGIC ABO: CPT

## 2024-11-26 PROCEDURE — C1751: CPT

## 2024-11-26 PROCEDURE — 83036 HEMOGLOBIN GLYCOSYLATED A1C: CPT

## 2024-11-26 PROCEDURE — 86850 RBC ANTIBODY SCREEN: CPT

## 2024-11-26 PROCEDURE — 85027 COMPLETE CBC AUTOMATED: CPT

## 2024-11-26 PROCEDURE — 71046 X-RAY EXAM CHEST 2 VIEWS: CPT

## 2024-11-26 PROCEDURE — 84295 ASSAY OF SERUM SODIUM: CPT

## 2024-11-26 PROCEDURE — 83735 ASSAY OF MAGNESIUM: CPT

## 2024-11-26 PROCEDURE — 36415 COLL VENOUS BLD VENIPUNCTURE: CPT

## 2024-11-26 PROCEDURE — 82330 ASSAY OF CALCIUM: CPT

## 2024-11-26 PROCEDURE — 93880 EXTRACRANIAL BILAT STUDY: CPT

## 2024-11-26 PROCEDURE — S2900: CPT

## 2024-11-26 PROCEDURE — 86901 BLOOD TYPING SEROLOGIC RH(D): CPT

## 2024-11-26 PROCEDURE — 71045 X-RAY EXAM CHEST 1 VIEW: CPT

## 2024-11-26 PROCEDURE — 86923 COMPATIBILITY TEST ELECTRIC: CPT

## 2024-11-26 PROCEDURE — 80053 COMPREHEN METABOLIC PANEL: CPT

## 2024-11-26 PROCEDURE — 93005 ELECTROCARDIOGRAM TRACING: CPT

## 2024-11-26 PROCEDURE — 85610 PROTHROMBIN TIME: CPT

## 2024-11-26 PROCEDURE — 85730 THROMBOPLASTIN TIME PARTIAL: CPT

## 2024-11-26 PROCEDURE — 85014 HEMATOCRIT: CPT

## 2024-11-26 PROCEDURE — 80048 BASIC METABOLIC PNL TOTAL CA: CPT

## 2024-11-26 PROCEDURE — 82962 GLUCOSE BLOOD TEST: CPT

## 2024-11-26 PROCEDURE — 82803 BLOOD GASES ANY COMBINATION: CPT

## 2024-11-26 PROCEDURE — C9399: CPT

## (undated) DEVICE — STABILIZER W STABLESOFT II LS

## (undated) DEVICE — ELCTR ZOLL DEFIBRILLATOR PAD NO REPLACEMENT

## (undated) DEVICE — XI SEAL UNIVERSIAL 5-12MM

## (undated) DEVICE — CATH CV TRAY INSR ST UNIV

## (undated) DEVICE — SYR LUER LOK 30CC

## (undated) DEVICE — SUT VICRYL 0 27" CT

## (undated) DEVICE — PACK OPEN HEART LNX

## (undated) DEVICE — SUT ETHIBOND 0 18" TIES

## (undated) DEVICE — CATH IV SAFE BC 24G X 0.75" (YELLOW)

## (undated) DEVICE — FOLEY TRAY 16FR 5CC LF LUBRISIL ADVANCE TEMP CLOSED

## (undated) DEVICE — SUCTION CATH AIRLIFE CONTROL VALVE TRIFLO 14FR

## (undated) DEVICE — SUT NUROLON 1 18" OS-8 (POP-OFF)

## (undated) DEVICE — DRAIN RESERVOIR FOR JACKSON PRATT 100CC CARDINAL

## (undated) DEVICE — DRAPE PROBE COVER 5" X 96"

## (undated) DEVICE — DRAPE FLUID WARMER 44 X 66"

## (undated) DEVICE — GOWN XXL

## (undated) DEVICE — XI DRAPE COLUMN

## (undated) DEVICE — DRAPE IOBAN 33" X 23"

## (undated) DEVICE — ELCTR BOVIE TIP BLADE INSULATED 6.5" EDGE

## (undated) DEVICE — SUT MONOCRYL 4-0 27" PS-2 UNDYED

## (undated) DEVICE — CHEST DRAIN PLEUR-EVAC DRY/WET ADULT-PEDS SINGLE (QUICK)

## (undated) DEVICE — Device

## (undated) DEVICE — PREP SCRUB BRUSH W CHG 4%

## (undated) DEVICE — LOOP VASC SILICONE ELASTOMER W NDL 18G

## (undated) DEVICE — ELCTR BOVIE TIP NEEDLE INSULATED 6.5" EDGE

## (undated) DEVICE — TUBING STRYKER PNEUMOCLEAR HIGH FLOW HEATED

## (undated) DEVICE — D HELP - CLEARVIEW CLEARIFY SYSTEM

## (undated) DEVICE — SUT PROLENE 6-0 30" RB-2

## (undated) DEVICE — DRSG RESTRAINT LIMB WRAP AROUND

## (undated) DEVICE — SOL ANTI FOG

## (undated) DEVICE — PACK PROC CV DRAPE

## (undated) DEVICE — NDL HYPO SAFE 22G X 1.5" (BLACK)

## (undated) DEVICE — POSITIONER FOAM EGG CRATE ULNAR 2PCS (PINK)

## (undated) DEVICE — DRSG BIOPATCH DISK W CHG 1" W 4.0MM HOLE

## (undated) DEVICE — DRSG TRACH DRAINAGE 4X4

## (undated) DEVICE — SUCTION CATH ARGYLE WHISTLE TIP 14FR STRAIGHT PACKED

## (undated) DEVICE — LUBRICANT INST ELECTROLUBE Z SOLUTION

## (undated) DEVICE — SYNOVIS VASCULAR PROBE 1.5MM 15CM

## (undated) DEVICE — SUT VICRYL 2-0 27" CT-1

## (undated) DEVICE — CATH TRIOX OXIMETRY 8F 3 LUMENS

## (undated) DEVICE — SUT VICRYL 0 27" CT-3

## (undated) DEVICE — ELCTR STRYKER NEPTUNE SMOKE EVACUATION PENCIL (GREEN)

## (undated) DEVICE — DRSG MEPILEX 10 X 25CM (4 X 10") AG

## (undated) DEVICE — SUT PROLENE 8-0 24" BV175-6

## (undated) DEVICE — ELCTR BOVIE TIP BLADE INSULATED 4" EDGE

## (undated) DEVICE — XI DRAPE ARM

## (undated) DEVICE — CATH NG SALEM SUMP 16FR

## (undated) DEVICE — SUT VICRYL 1 36" CTX UNDYED

## (undated) DEVICE — SUT PROLENE 7-0 24" BV175-6

## (undated) DEVICE — DRSG DERMABOND 0.7ML

## (undated) DEVICE — BLOWER MISTER AXIUS WITH IV SET

## (undated) DEVICE — DRSG ALLEVYN LIFE 6 X 6 (PINK)